# Patient Record
Sex: FEMALE | Race: BLACK OR AFRICAN AMERICAN | NOT HISPANIC OR LATINO | Employment: UNEMPLOYED | URBAN - METROPOLITAN AREA
[De-identification: names, ages, dates, MRNs, and addresses within clinical notes are randomized per-mention and may not be internally consistent; named-entity substitution may affect disease eponyms.]

---

## 2018-04-27 ENCOUNTER — OFFICE VISIT (OUTPATIENT)
Dept: PULMONOLOGY | Facility: CLINIC | Age: 38
End: 2018-04-27
Payer: MEDICAID

## 2018-04-27 ENCOUNTER — LAB VISIT (OUTPATIENT)
Dept: LAB | Facility: HOSPITAL | Age: 38
End: 2018-04-27
Attending: INTERNAL MEDICINE
Payer: MEDICAID

## 2018-04-27 VITALS
HEIGHT: 65 IN | OXYGEN SATURATION: 99 % | BODY MASS INDEX: 35.48 KG/M2 | HEART RATE: 60 BPM | SYSTOLIC BLOOD PRESSURE: 122 MMHG | WEIGHT: 212.94 LBS | RESPIRATION RATE: 18 BRPM | DIASTOLIC BLOOD PRESSURE: 82 MMHG

## 2018-04-27 DIAGNOSIS — R06.00 DYSPNEA AND RESPIRATORY ABNORMALITIES: Primary | ICD-10-CM

## 2018-04-27 DIAGNOSIS — R06.89 DYSPNEA AND RESPIRATORY ABNORMALITIES: Primary | ICD-10-CM

## 2018-04-27 DIAGNOSIS — R06.00 DYSPNEA AND RESPIRATORY ABNORMALITIES: ICD-10-CM

## 2018-04-27 DIAGNOSIS — R06.89 DYSPNEA AND RESPIRATORY ABNORMALITIES: ICD-10-CM

## 2018-04-27 LAB
CRP SERPL-MCNC: 9.5 MG/L
ERYTHROCYTE [SEDIMENTATION RATE] IN BLOOD BY WESTERGREN METHOD: 19 MM/HR

## 2018-04-27 PROCEDURE — 36415 COLL VENOUS BLD VENIPUNCTURE: CPT

## 2018-04-27 PROCEDURE — 85651 RBC SED RATE NONAUTOMATED: CPT

## 2018-04-27 PROCEDURE — 99204 OFFICE O/P NEW MOD 45 MIN: CPT | Mod: S$PBB,,, | Performed by: INTERNAL MEDICINE

## 2018-04-27 PROCEDURE — 86038 ANTINUCLEAR ANTIBODIES: CPT

## 2018-04-27 PROCEDURE — 86140 C-REACTIVE PROTEIN: CPT

## 2018-04-27 PROCEDURE — 86255 FLUORESCENT ANTIBODY SCREEN: CPT | Mod: 91

## 2018-04-27 PROCEDURE — 99203 OFFICE O/P NEW LOW 30 MIN: CPT | Mod: PBBFAC | Performed by: INTERNAL MEDICINE

## 2018-04-27 PROCEDURE — 99999 PR PBB SHADOW E&M-NEW PATIENT-LVL III: CPT | Mod: PBBFAC,,, | Performed by: INTERNAL MEDICINE

## 2018-04-27 RX ORDER — MONTELUKAST SODIUM 10 MG/1
10 TABLET ORAL DAILY
COMMUNITY
Start: 2018-02-19

## 2018-04-27 RX ORDER — FUROSEMIDE 20 MG/1
20 TABLET ORAL DAILY
COMMUNITY
Start: 2018-04-04

## 2018-04-27 RX ORDER — FLUTICASONE PROPIONATE 50 MCG
1 SPRAY, SUSPENSION (ML) NASAL
COMMUNITY
Start: 2018-03-28

## 2018-04-27 RX ORDER — METOPROLOL TARTRATE 25 MG/1
25 TABLET, FILM COATED ORAL DAILY
COMMUNITY
Start: 2018-04-03 | End: 2019-01-30

## 2018-04-27 RX ORDER — LORATADINE 10 MG/1
10 TABLET ORAL DAILY
COMMUNITY
Start: 2018-02-19

## 2018-04-27 RX ORDER — ALBUTEROL SULFATE 90 UG/1
AEROSOL, METERED RESPIRATORY (INHALATION)
COMMUNITY
Start: 2018-03-28 | End: 2019-09-11

## 2018-04-27 RX ORDER — LUBIPROSTONE 24 UG/1
24 CAPSULE, GELATIN COATED ORAL
COMMUNITY
Start: 2018-01-31

## 2018-04-27 RX ORDER — CEPHALEXIN 500 MG/1
CAPSULE ORAL
COMMUNITY
Start: 2018-04-24 | End: 2018-05-29 | Stop reason: ALTCHOICE

## 2018-04-27 NOTE — PROGRESS NOTES
"New patient    Subjective:      Patient ID: Floyd Luis is a 38 y.o. female.    Patient Active Problem List   Diagnosis    Dyspnea and respiratory abnormalities       Problem list has been reviewed.    she has been referred by Self, [facility] for evaluation and management for   Chief Complaint   Patient presents with    Asthma    Shortness of Breath    Wheezing       Chief Complaint: Asthma; Shortness of Breath; and Wheezing      HPI:      Shortness of breath for 4 months Associated with  Non productivecough, wheezing and "rattling" in her back.       Patient reports cough and difficulty breathing and denies abdominal pain and diarrhea. Patient denies recent sick contacts.   Patient does not have new pets. Patient does have a history of childhood asthma. She reports that her last asthma attack was at age 15. She has never been hospitalized for asthma, She has never been intubated for asthma.  Patient does have a history of environmental allergens. Patient has not traveled recently. Patient does not have a history of smoking. Patient has not had a previous chest x-ray. Patient has had a PPD done.  PPD was Negative      Dyspnea Characteristics:   Exertional Dyspnea   Dyspnea Duration:  Chronic  Dyspnea Severity:  TAURUS 10  Dyspnea Timing:   Nocturnal and  Orthopnea  Dyspnea Contributing Factors:  exercise   Dyspnea Associated Symptoms:  Cough, wheeze,      Modified Taurus Dyspnea Scale      0  Nothing at all    0.5  Very, very slight (just noticeable)    1  Very slight    2  Slight    3  Moderate    4 Somewhat severe    5 Severe      6    7  Very severe    8    9   Very, very severe (almost maximal)  10  Maximal    She has SABINA. She is on CPAP. She does not know her CPAP setting     Previous Report Reviewed: lab reports, office notes and radiology reports     Past Medical History: The following portions of the patient's history were reviewed and updated as appropriate:   She  has a past surgical history " "that includes Hysterectomy (2010); Hernia repair (2006); Bowel resection (Bilateral, 2002); and Gallbladder surgery (2015).  Her family history includes Hypertension in her brother and father.  She  reports that she has never smoked. She has never used smokeless tobacco. She reports that she does not drink alcohol or use drugs.  She has a current medication list which includes the following prescription(s): amitiza, cephalexin, fluticasone, furosemide, loratadine, metoprolol tartrate, montelukast, and ventolin hfa.  She is allergic to codeine; penicillins; and sulfamethoxazole-trimethoprim..    Review of Systems   Constitutional: Negative for fever, fatigue and night sweats.   HENT: Positive for sinus pressure and sore throat. Negative for congestion and ear pain.    Eyes: Negative for itching.   Respiratory: Positive for cough, wheezing and dyspnea on extertion. Negative for chest tightness.    Cardiovascular: Negative for chest pain and leg swelling.   Genitourinary: Negative for difficulty urinating.   Endocrine: Negative for cold intolerance and heat intolerance.    Musculoskeletal: Negative for arthralgias and back pain.   Skin: Negative for rash.   Gastrointestinal: Positive for nausea. Negative for vomiting and acid reflux.   Neurological: Positive for headaches. Negative for dizziness.   Hematological: Excessive bruising.   Psychiatric/Behavioral: The patient is nervous/anxious.    All other systems reviewed and are negative.         Occupational History:    Curently unemployed  Denies occupational exposure to asbestos, silica and petrochemicals.    Avocational Exposures:  none    Pet Exposures:  none      Objective:     Vitals:    04/27/18 1355   BP: 122/82   Pulse: 60   Resp: 18   SpO2: 99%   Weight: 96.6 kg (212 lb 15.4 oz)   Height: 5' 5" (1.651 m)     Body mass index is 35.44 kg/m².    Physical Exam   Constitutional: She is oriented to person, place, and time. She appears well-developed and " well-nourished.   HENT:   Head: Normocephalic and atraumatic.   Eyes: EOM are normal. Pupils are equal, round, and reactive to light.   Neck: Normal range of motion. Neck supple.   Cardiovascular: Normal rate and regular rhythm.    Pulmonary/Chest: Effort normal and breath sounds normal.   Abdominal: Soft. Bowel sounds are normal.   Musculoskeletal: Normal range of motion.   Neurological: She is alert and oriented to person, place, and time.   Skin: Skin is warm and dry. Capillary refill takes less than 2 seconds.   Psychiatric: She has a normal mood and affect. Her behavior is normal.   Nursing note and vitals reviewed.      Personal Diagnostic Review    Pulmonary function tests: PFT 01/31/17:  This is a well-done study and acceptable as per ATS criteria.  FVC  is 2.61 liters, which is 82% of predicted.  FEV1 is 1.94 liters, which is 74% of predicted.  Ratio is 74.  Post-bronchodilator spirometry did not show any change.  Total lung capacity is 3.64 liters, which is 70% of predicted and  DLCO is 81% of predicted.: Mild restriction.        Assessment /Plan:     Discussed diagnosis, its evaluation, treatment and usual course. All questions answered.    Problem List Items Addressed This Visit        Other    Dyspnea and respiratory abnormalities - Primary    Current Assessment & Plan     Etiology unclear.  Multifactorial etiology suspected.  Likely contributors to etiology (checked)    [x] Pulmonary airway disease    [x]  Pulmonary parenchymal disease  [] Pulmonary vascular disease   [] Pleural disease  [] Pulmonary vasculitis  [] Hypoventilation ( chest wall deformity, neuromuscular disease, obesity etc)  [] Anemia  [] Thyroid disease.  [] Cardiac illess  [x]         Sleep disorder    EVALUATION:  [x]        Complete PFT with bronchodilator.  []        Bronchial challenge with methacholine.   [x]        Stress test, pulmonary.  [x]        PULM - Arterial Blood Gases  [x]        Chest X Ray  []        CT scan of  chest.   [x]        CHITRA  [x]        Sedimentation rate  [x]        C-reactive protein  [x]        Anti-neutrophilic cytoplasmic antibody          PLAN:  Discussed diagnosis, its evaluation, treatment and usual course.  All questions answered.        Call if shortness of breath worsens, blood in sputum, change in character of cough, development of fever or chills, inability to maintain nutrition and hydration.     Re evaluate in four weeks with results.           Relevant Orders    Complete PFT with bronchodilator    PULM - Arterial Blood Gases--in addition to PFT only    Pulmonary stress test    Sedimentation rate, manual    C-reactive protein    CHITRA    Anti-neutrophilic cytoplasmic antibody    X-Ray Chest PA And Lateral              TIME SPENT WITH PATIENT: Time spent: 45 minutes in face to face  discussion concerning diagnosis, prognosis, review of lab and test results, benefits of treatment as well as management of disease, counseling of patient and coordination of care between various health  care providers . Greater than half the time spent was used for coordination of care and counseling of patient.     No Follow-up on file.

## 2018-04-27 NOTE — ASSESSMENT & PLAN NOTE
Etiology unclear.  Multifactorial etiology suspected.  Likely contributors to etiology (checked)    [x] Pulmonary airway disease    [x]  Pulmonary parenchymal disease  [] Pulmonary vascular disease   [] Pleural disease  [] Pulmonary vasculitis  [] Hypoventilation ( chest wall deformity, neuromuscular disease, obesity etc)  [] Anemia  [] Thyroid disease.  [] Cardiac illess  [x]         Sleep disorder    EVALUATION:  [x]        Complete PFT with bronchodilator.  []        Bronchial challenge with methacholine.   [x]        Stress test, pulmonary.  [x]        PULM - Arterial Blood Gases  [x]        Chest X Ray  []        CT scan of chest.   [x]        CHITRA  [x]        Sedimentation rate  [x]        C-reactive protein  [x]        Anti-neutrophilic cytoplasmic antibody          PLAN:  Discussed diagnosis, its evaluation, treatment and usual course.  All questions answered.        Call if shortness of breath worsens, blood in sputum, change in character of cough, development of fever or chills, inability to maintain nutrition and hydration.     Re evaluate in four weeks with results.

## 2018-04-27 NOTE — PATIENT INSTRUCTIONS
Lung Anatomy  Your lungs take air in to give your body oxygen, which the body needs to work. Your lungs, like all the tissues in your body, are made up of billions of tiny specialized cells. Old lung cells die and are replaced by new, identical lung cells. This natural process helps ensure healthy lungs.    Date Last Reviewed: 11/1/2016  © 3772-8259 [employer]. [address] [place] [address], [place] [address]. All rights reserved. This information is not intended as a substitute for professional medical care. Always follow your healthcare professional's instructions.

## 2018-04-30 LAB
ANA SER QL IF: NORMAL
ANCA AB TITR SER IF: NORMAL TITER
P-ANCA TITR SER IF: NORMAL TITER

## 2018-05-14 ENCOUNTER — TELEPHONE (OUTPATIENT)
Dept: PULMONOLOGY | Facility: CLINIC | Age: 38
End: 2018-05-14

## 2018-05-14 NOTE — TELEPHONE ENCOUNTER
----- Message from Alix Haro sent at 5/14/2018  1:43 PM CDT -----  Contact: pt mom- Caroline Velazquez state she have questions/concerns regarding patient pulmonary test . Please adv/call [phone].[email or web address]

## 2018-05-21 ENCOUNTER — PROCEDURE VISIT (OUTPATIENT)
Dept: PULMONOLOGY | Facility: CLINIC | Age: 38
End: 2018-05-21
Payer: MEDICAID

## 2018-05-21 ENCOUNTER — HOSPITAL ENCOUNTER (OUTPATIENT)
Dept: RADIOLOGY | Facility: HOSPITAL | Age: 38
Discharge: HOME | End: 2018-05-21
Attending: INTERNAL MEDICINE
Payer: MEDICAID

## 2018-05-21 VITALS — BODY MASS INDEX: 35.49 KG/M2 | WEIGHT: 213 LBS | HEIGHT: 65 IN

## 2018-05-21 DIAGNOSIS — R06.89 DYSPNEA AND RESPIRATORY ABNORMALITIES: ICD-10-CM

## 2018-05-21 DIAGNOSIS — R06.00 DYSPNEA AND RESPIRATORY ABNORMALITIES: ICD-10-CM

## 2018-05-21 LAB
ALLENS TEST: NORMAL
BF RES PRE: 34.56 BPM
BRPFT: ABNORMAL
DELSYS: NORMAL
DLCO ADJ PRE: 16.57 ML/(MIN*MMHG) (ref 20.68–32.15)
DLCO PRE: 16.57 ML/(MIN*MMHG) (ref 20.68–32.15)
DLCO SINGLE BREATH LLN: 20.68
DLCO SINGLE BREATH PRE REF: 62.7 %
DLCO SINGLE BREATH REF: 26.41
DLCOC SBVA LLN: 3.67
DLCOC SBVA PRE REF: 101.7 %
DLCOC SBVA REF: 5.18
DLCOC SINGLE BREATH LLN: 20.68
DLCOC SINGLE BREATH PRE REF: 62.7 %
DLCOC SINGLE BREATH REF: 26.41
DLCOVA LLN: 3.67
DLCOVA PRE REF: 101.7 %
DLCOVA PRE: 5.27 ML/(MIN*MMHG*L) (ref 3.67–6.69)
DLCOVA REF: 5.18
DLVAADJ PRE: 5.27 ML/(MIN*MMHG*L) (ref 3.67–6.69)
ERV LLN: 1.12
ERV PRE REF: 27.6 %
ERV PRE: 0.31 L (ref 1.12–1.12)
ERV REF: 1.12
ERVN2 LLN: 1.12
ERVN2 REF: 1.12
FEF 25 75 LLN: 1.57
FEF 25 75 PRE REF: 28.4 %
FEF 25 75 PRE: 0.82 L/S (ref 1.57–4.22)
FEF 25 75 REF: 2.89
FET100 PRE: 11.47 SEC
FEV1 FVC LLN: 72
FEV1 FVC PRE REF: 78.3 %
FEV1 FVC PRE: 64.84 % (ref 72.1–93.57)
FEV1 FVC REF: 83
FEV1 LLN: 2.09
FEV1 PRE REF: 54.9 %
FEV1 PRE: 1.48 L (ref 2.09–3.31)
FEV1 REF: 2.7
FEV6 LLN: 2.42
FEV6 PRE REF: 68 %
FEV6 PRE: 2.14 L (ref 2.42–3.87)
FEV6 REF: 3.14
FIF50 PRE: 2.12 L/S
FRCN2 LLN: 1.91
FRCN2 REF: 2.74
FRCPL PRE: 2.31 L
FRCPLETH LLN: 1.91
FRCPLETH PREREF: 84.6 %
FRCPLETH REF: 2.74
FVC LLN: 2.56
FVC PRE REF: 69.8 %
FVC PRE: 2.28 L (ref 2.56–3.99)
FVC REF: 3.27
GAW LLN: 0.33
GAW PRE REF: 85.4 %
GAW PRE: 0.28 (L/S)/CMH2O (ref 0.33–0.33)
GAW REF: 0.33
HCO3 UR-SCNC: 25 MMOL/L (ref 24–28)
ISOFEF PRE: 0.82 L/S
IVC PRE: 2 L (ref 2.56–3.99)
IVC SINGLE BREATH LLN: 2.56
IVC SINGLE BREATH PRE REF: 61.1 %
IVC SINGLE BREATH REF: 3.27
MVV LLN: 100
MVV REF: 118
PCO2 BLDA: 39.9 MMHG (ref 35–45)
PEF LLN: 4.73
PEF PRE REF: 47.4 %
PEF PRE: 3.22 L/S (ref 4.73–8.86)
PEF REF: 6.8
PH SMN: 7.4 [PH] (ref 7.35–7.45)
PO2 BLDA: 82 MMHG (ref 80–100)
POC BE: 0 MMOL/L
POC SATURATED O2: 96 % (ref 95–100)
RAW LLN: 3.06
RAW PRE REF: 117.1 %
RAW PRE: 3.58 CMH2O*S/L (ref 3.06–3.06)
RAW REF: 3.06
RV LLN: 1.03
RV PRE REF: 125.7 %
RV PRE: 2.02 L (ref 1.03–2.19)
RV REF: 1.61
RVN2 LLN: 1.03
RVN2 REF: 1.61
RVN2TLCN2 LLN: 23
RVN2TLCN2 REF: 32
RVTLC LLN: 23
RVTLC PRE REF: 141 %
RVTLC PRE: 45.44 % (ref 22.63–41.81)
RVTLC REF: 32
SAMPLE: NORMAL
SGAW LLN: 0.1
SGAW PRE REF: 118.3 %
SGAW PRE: 0.12 1/(CMH2O*S) (ref 0.1–0.1)
SGAW REF: 0.1
SITE: NORMAL
SRAW LLN: 9.81
SRAW PRE REF: 92.2 %
SRAW PRE: 9.04 CMH2O*S (ref 9.81–9.81)
SRAW REF: 9.81
TLC LLN: 4.11
TLC PRE REF: 87.3 %
TLC PRE: 4.45 L (ref 4.11–6.09)
TLC REF: 5.1
TLCN2 LLN: 4.11
TLCN2 REF: 5.1
VA PRE: 3.15 L (ref 4.12–6.32)
VA SINGLE BREATH LLN: 4.12
VA SINGLE BREATH PRE REF: 60.3 %
VA SINGLE BREATH REF: 5.22
VC LLN: 2.56
VC PRE REF: 74.3 %
VC PRE: 2.43 L (ref 2.56–3.99)
VC REF: 3.27
VCMAXN2 LLN: 2.56
VCMAXN2 REF: 3.27
VTGRAWPRE: 2.52 L

## 2018-05-21 PROCEDURE — 71046 X-RAY EXAM CHEST 2 VIEWS: CPT | Mod: TC,FY,PO

## 2018-05-21 PROCEDURE — 94010 BREATHING CAPACITY TEST: CPT | Mod: 26,59,S$PBB, | Performed by: INTERNAL MEDICINE

## 2018-05-21 PROCEDURE — 94618 PULMONARY STRESS TESTING: CPT | Mod: 26,S$PBB,, | Performed by: INTERNAL MEDICINE

## 2018-05-21 PROCEDURE — 94726 PLETHYSMOGRAPHY LUNG VOLUMES: CPT | Mod: 26,S$PBB,, | Performed by: INTERNAL MEDICINE

## 2018-05-21 PROCEDURE — 82803 BLOOD GASES ANY COMBINATION: CPT | Mod: PBBFAC,PO

## 2018-05-21 PROCEDURE — 71046 X-RAY EXAM CHEST 2 VIEWS: CPT | Mod: 26,,, | Performed by: RADIOLOGY

## 2018-05-21 PROCEDURE — 94729 DIFFUSING CAPACITY: CPT | Mod: PBBFAC,PO

## 2018-05-21 PROCEDURE — 94726 PLETHYSMOGRAPHY LUNG VOLUMES: CPT | Mod: PBBFAC,PO

## 2018-05-21 PROCEDURE — 94618 PULMONARY STRESS TESTING: CPT | Mod: PBBFAC,PO

## 2018-05-21 PROCEDURE — 94010 BREATHING CAPACITY TEST: CPT | Mod: PBBFAC,PO

## 2018-05-21 PROCEDURE — 94729 DIFFUSING CAPACITY: CPT | Mod: 26,S$PBB,, | Performed by: INTERNAL MEDICINE

## 2018-05-21 PROCEDURE — 36600 WITHDRAWAL OF ARTERIAL BLOOD: CPT | Mod: PBBFAC,PO

## 2018-05-21 PROCEDURE — 36600 WITHDRAWAL OF ARTERIAL BLOOD: CPT | Mod: 59,S$PBB,, | Performed by: INTERNAL MEDICINE

## 2018-05-21 NOTE — PROCEDURES
PHYSICIAN INTERPRETATION:    Results for orders placed or performed in visit on 05/21/18   ISTAT PROCEDURE   Result Value Ref Range    POC PH 7.404 7.35 - 7.45    POC PCO2 39.9 35 - 45 mmHg    POC PO2 82 80 - 100 mmHg    POC HCO3 25.0 24 - 28 mmol/L    POC BE 0 -2 to 2 mmol/L    POC SATURATED O2 96 95 - 100 %    Sample ARTERIAL     Site LR     Allens Test Pass     DelSys Room Air         Normal ABG.

## 2018-05-21 NOTE — PROCEDURES
"[facility]  Six Minute Walk     SUMMARY     Ordering Provider: Abdi   Interpreting Provider: Abdi  Performing nurse/tech/RT: Sushila Quintero  Diagnosis:  (Dyspnea and respiratory abnormalities)  Height: 5' 5" (165.1 cm)  Weight: 96.6 kg (213 lb)  BMI (Calculated): 35.5   Patient Race:             Phase Oxygen Assessment Supplemental O2 Heart   Rate Blood Pressure Taurus Dyspnea Scale Rating   Resting 98 % Room Air 91 bpm (!) 135/93 7-8   Exercise        Minute        1 91 % Room Air 71 bpm     2 91 % Room Air 104 bpm     3 89 % Room Air 123 bpm     4 100 % Room Air 129 bpm     5 94 % Room Air 115 bpm     6  91 % Room Air 126 bpm (!) 159/96 9   Recovery        Minute        1 98 % Room Air 113 bpm     2 99 % Room Air 106 bpm     3 100 % Room Air 102 bpm     4 100 % Room Air 99 bpm 145/89 9     Six Minute Walk Summary  6MWT Status: completed without stopping  Patient Reported: No complaints     Interpretation:  Did the patient stop or pause?: No        Total Time Walked (Calculated): 360 seconds  Final Partial Lap Distance (feet): 0 feet  Total Distance Meters (Calculated): 487.68 meters  Predicted Distance Meters (Calculated): 568.69 meters  Percentage of Predicted (Calculated): 85.75  Peak VO2 (Calculated): 18.61  Mets: 5.32  Has The Patient Had a Previous Six Minute Walk Test?: No       Previous 6MWT Results  Has The Patient Had a Previous Six Minute Walk Test?: No           PHYSICIAN INTERPRETATION:      Six minute walk distance is 487.68 / 568.69 meters  (86.75 % predicted) with very, very heavy dyspnea. Peak VO2 during walking was 18.61  Ml/min/kg [ 5.32 METS]. During exercise, there was no significant desaturation while breathing room air. Both blood pressure and heart rate increased significantly with walking. The patient did not report non-pulmonary symptoms during exercise. Severe dyspnea  is likely due to subjective symptoms. The patient did complete the study, walking " 360 seconds of the 360 second test. The patient may benefit from a graduated exercise program . No previous study performed. Based upon age and body mass index, exercise capacity is normal.

## 2018-05-29 ENCOUNTER — OFFICE VISIT (OUTPATIENT)
Dept: PULMONOLOGY | Facility: CLINIC | Age: 38
End: 2018-05-29
Payer: MEDICAID

## 2018-05-29 ENCOUNTER — LAB VISIT (OUTPATIENT)
Dept: LAB | Facility: HOSPITAL | Age: 38
End: 2018-05-29
Attending: INTERNAL MEDICINE
Payer: MEDICAID

## 2018-05-29 VITALS
OXYGEN SATURATION: 96 % | BODY MASS INDEX: 35.67 KG/M2 | DIASTOLIC BLOOD PRESSURE: 74 MMHG | RESPIRATION RATE: 18 BRPM | HEART RATE: 88 BPM | WEIGHT: 214.06 LBS | HEIGHT: 65 IN | SYSTOLIC BLOOD PRESSURE: 110 MMHG

## 2018-05-29 DIAGNOSIS — J44.89 ASTHMA WITH COPD: ICD-10-CM

## 2018-05-29 DIAGNOSIS — E66.01 CLASS 2 SEVERE OBESITY DUE TO EXCESS CALORIES WITH SERIOUS COMORBIDITY AND BODY MASS INDEX (BMI) OF 35.0 TO 35.9 IN ADULT: Chronic | ICD-10-CM

## 2018-05-29 DIAGNOSIS — G47.33 OSA ON CPAP: Chronic | ICD-10-CM

## 2018-05-29 DIAGNOSIS — J44.89 ASTHMA WITH COPD: Primary | ICD-10-CM

## 2018-05-29 PROBLEM — J44.9 COPD, SEVERE: Status: ACTIVE | Noted: 2018-05-29

## 2018-05-29 PROCEDURE — 99213 OFFICE O/P EST LOW 20 MIN: CPT | Mod: PBBFAC | Performed by: INTERNAL MEDICINE

## 2018-05-29 PROCEDURE — 99999 PR PBB SHADOW E&M-EST. PATIENT-LVL III: CPT | Mod: PBBFAC,,, | Performed by: INTERNAL MEDICINE

## 2018-05-29 PROCEDURE — 99215 OFFICE O/P EST HI 40 MIN: CPT | Mod: S$PBB,,, | Performed by: INTERNAL MEDICINE

## 2018-05-29 PROCEDURE — 36415 COLL VENOUS BLD VENIPUNCTURE: CPT

## 2018-05-29 PROCEDURE — 82542 COL CHROMOTOGRAPHY QUAL/QUAN: CPT

## 2018-05-29 PROCEDURE — 82164 ANGIOTENSIN I ENZYME TEST: CPT

## 2018-05-29 RX ORDER — ALBUTEROL SULFATE 90 UG/1
1-2 AEROSOL, METERED RESPIRATORY (INHALATION)
COMMUNITY
Start: 2015-01-14 | End: 2019-09-11 | Stop reason: SDUPTHER

## 2018-05-29 NOTE — PROGRESS NOTES
Subjective:      Established patient    Patient ID: Floyd Luis is a 38 y.o. female.  Patient Active Problem List   Diagnosis    Dyspnea and respiratory abnormalities    Asthma with COPD    SABINA on CPAP    Class 2 severe obesity due to excess calories with serious comorbidity and body mass index (BMI) of 35.0 to 35.9 in adult       Problem list has been reviewed.    Chief Complaint: Asthma and Sleep Apnea    Group Spirometric Classification Exacerbations / year mMRC CAT   Group B: Low risk; more symptoms  GOLD 1- 2  < = 1 >= 2 >=10   Group C: High risk; less symptoms  GOLD 3- 4  > = 2 0 - 1 <10   Group D: High risk; more sypmtoms  GOLD 3-4 > = 2 >= 2 >= 10     HPI    PFT, 6 MWT and ABG reviewed with patient who expressed and voiced understanding.   All questions were answered to the patients satisfaction.      Patients reports NYHA III dyspnea    The patient does not have currently have symptoms / an exacerbation.       No recent change in breathing.     Her current respiratory therapy regimen is albuterol/ipratropium inhaler which provides  relief. She is adherent with her regimen.            She is on CPAP of 8 CMWP. She definitely thinks PAP is beneficial to her health and she wants to continue with PAP therapy.    Rawson Sleepiness Scale   EPWORTH SLEEPINESS SCALE 5/29/2018   Sitting and reading 0   Watching TV 1   Sitting, inactive in a public place (e.g. a theatre or a meeting) 1   As a passenger in a car for an hour without a break 1   Lying down to rest in the afternoon when circumstances permit 2   Sitting and talking to someone 0   Sitting quietly after a lunch without alcohol 2   In a car, while stopped for a few minutes in traffic 0   Total score 7     Compliance Summary  4/24/2018 - 5/23/2018 (30 days)  Days with Device Usage 13 days  Days without Device Usage 17 days  Percent Days with Device Usage 43.3%  Cumulative Usage 18 hrs. 22 mins. 31 secs.  Maximum Usage (1 Day) 3 hrs. 11 mins.  39 secs.  Average Usage (All Days) 36 mins. 45 secs.  Average Usage (Days Used) 1 hrs. 24 mins. 48 secs.  Minimum Usage (1 Day) 14 mins.  Percent of Days with Usage >= 4 Hours 0.0%  Percent of Days with Usage < 4 Hours 100.0%  Date Range  Total Blower Time 1 day 5 hrs. 5 mins. 40 secs.  CPAP Summary  Average Time in Large Leak Per Day 1 mins. 23 secs.  Average AHI 5.3  CPAP 8.0 cmH2O       A full  review of systems, past , family  and social histories was performed except as mentioned in the note above, these are non contributory to the main issues discussed today.       Previous Report Reviewed: lab reports, office notes and radiology reports     The following portions of the patient's history were reviewed and updated as appropriate: She  has a past medical history of Asthma; Hypertension; and Thyroid disease.  She  has a past surgical history that includes Hysterectomy (2010); Hernia repair (2006); Bowel resection (Bilateral, 2002); and Gallbladder surgery (2015).  Her family history includes Hypertension in her brother and father.  She  reports that she has never smoked. She has never used smokeless tobacco. She reports that she does not drink alcohol or use drugs.  She has a current medication list which includes the following prescription(s): albuterol, amitiza, fluticasone, furosemide, loratadine, metoprolol tartrate, montelukast, ventolin hfa, and beclomethasone.  She is allergic to codeine; penicillins; and sulfamethoxazole-trimethoprim..    Review of Systems   Constitutional: Negative for fever, fatigue and night sweats.   HENT: Positive for sinus pressure and sore throat. Negative for congestion and ear pain.    Eyes: Negative for itching.   Respiratory: Positive for cough, wheezing and dyspnea on extertion. Negative for chest tightness.    Cardiovascular: Negative for chest pain and leg swelling.   Genitourinary: Negative for difficulty urinating.   Endocrine: Negative for cold intolerance and heat  "intolerance.    Musculoskeletal: Negative for arthralgias and back pain.   Skin: Negative for rash.   Gastrointestinal: Positive for nausea. Negative for vomiting and acid reflux.   Neurological: Positive for headaches. Negative for dizziness.   Hematological: Excessive bruising.   Psychiatric/Behavioral: The patient is nervous/anxious.    All other systems reviewed and are negative.       Objective:     /74   Pulse 88   Resp 18   Ht 5' 5" (1.651 m)   Wt 97.1 kg (214 lb 1.1 oz)   SpO2 96%   BMI 35.62 kg/m²   Body mass index is 35.62 kg/m².     Physical Exam   Constitutional: She is oriented to person, place, and time. She appears well-developed and well-nourished.   HENT:   Head: Normocephalic and atraumatic.   Eyes: EOM are normal. Pupils are equal, round, and reactive to light.   Neck: Normal range of motion. Neck supple.   Cardiovascular: Normal rate and regular rhythm.    Pulmonary/Chest: Effort normal and breath sounds normal.   Abdominal: Soft. Bowel sounds are normal.   Musculoskeletal: Normal range of motion.   Neurological: She is alert and oriented to person, place, and time.   Skin: Skin is warm and dry. Capillary refill takes less than 2 seconds.   Psychiatric: She has a normal mood and affect. Her behavior is normal.   Nursing note and vitals reviewed.      Personal Diagnostic Review    Cytoplasmic Neutrophilic Ab <1:20 Titer <1:20      CHITRA Screen Negative <1:160 Negative <1:160      CRP 0.0 - 8.2 mg/L 9.5       Sed Rate 0 - 20 mm/Hr 19           ABG:   Results for orders placed or performed in visit on 05/21/18   ISTAT PROCEDURE   Result Value Ref Range     POC PH 7.404 7.35 - 7.45     POC PCO2 39.9 35 - 45 mmHg     POC PO2 82 80 - 100 mmHg     POC HCO3 25.0 24 - 28 mmol/L     POC BE 0 -2 to 2 mmol/L     POC SATURATED O2 96 95 - 100 %     Sample ARTERIAL       Site LR       Allens Test Pass       DelSys Room Air           Normal ABG.      Pulmonary function tests: FEV1: 1.48 (54.9 % " predicted), FVC:  2.28 (69.8 % predicted), FEV1/FVC:  65, T.45 (87.3 % predicted), RV/TLVC: 45 (141 % predicted), DLCO: 16 (57 % predicted)  : Test is of acceptable quality. Baseline spirometry reveals moderately severe expiratory airflow obstruction. Post bronchodilatory spirometry was not performed because patient  used her prescribed bronchodilator on the morning of the test. Static lung volumes reveal mild air trapping but no hyperinflation. Unadjusted diffusion capacity is mildly reduced, however diffusion capacity adjusted for alveolar volume is normal. This is because alveolar volume is underestimated indicating that there are large areas of underventliated lung.      Six Minute Walk Test:Six minute walk distance is 487.68 / 568.69 meters  (86.75 % predicted) with very, very heavy dyspnea. Peak VO2 during walking was 18.61  Ml/min/kg [ 5.32 METS]. During exercise, there was no significant desaturation while breathing room air. Both blood pressure and heart rate increased significantly with walking. The patient did not report non-pulmonary symptoms during exercise. Severe dyspnea  is likely due to subjective symptoms. The patient did complete the study, walking 360 seconds of the 360 second test. The patient may benefit from a graduated exercise program . No previous study performed. Based upon age and body mass index, exercise capacity is normal.      Chest x-ray: The cardiac and mediastinal silhouettes appear within normal limits.   Linear opacity representing discoid atelectasis versus scarring noted in the right lung base.  No parenchymal consolidations or pleural effusions demonstrated.  No acute osseous findings demonstrated.          MARCIA Index for COPD Survival Prediction   Select Criteria:   FEV1 % Predicted After Bronchodialator     [] >= 65% (0 points)    [x] 50-64% (1 point)    [] 36-49% (2 points)    [] <= 35% (3 points)   6 Minute Walk Distance     [x] >= 350 Meters (0 points)    [] 250-349  Meters (1 point)    [] 150-249 Meters (2 points)    [] <= 149 Meters (3 points)   MMRC Dyspnea Scale (4 is worst)     [x] MMRC 0: Dyspneic on strenuous excercise (0 points)    [] MMRC 1: Dyspneic on walking a slight hill (0 points)    [] MMRC 2: Dyspneic on walking level ground; must stop occasionally due to breathlessness (1 point)    [] MMRC 3: Must stop for breathlessness after walking 100 yards or after a few minutes (2 points)    [] MMRC 4: Cannot leave house; breathless on dressing/undressing (3 points)   Body Mass Index     [x] > 21 (0 points)    [] <= 21 (1 point)   Results: Total Criteria Point Count:     Approximate 4 Year Survival Interpretation   0-2 Points:  [x] 80%   3-4 Points:  [] 67%   5-6 Points:  [] 57%   7-10 Points:[] 18%         References  1. Vivian BR, Stacey CG, Tim HASSAN, et. al. The body-mass index, airflow obstruction, dyspnea and exercise capacity index in chronic obstructive pulmonary disease. N Engl J Med. 2004 Mar 4;350(10):1005-12.  2. Sunil DA, Regulo CK. Evaluation of clinical methods for rating dyspnea. Chest. 1988 Mar;93(3):580-6      Assessment / Plan:       Discussed diagnosis, its evaluation, treatment and usual course. All questions answered.    Problem List Items Addressed This Visit        Pulmonary    Asthma with COPD - Primary    Current Assessment & Plan     COPD ROS:  PERSISTENT BURGER.  Using bronchodilator MDI less than twice a week.   New concerns:PERSISTENT BURGER.   Exam: appears well, vitals normal, no respiratory distress, acyanotic, normal RR.   Assessment:  COPD control uncertain.   Plan: Start ANORO. Continue COMBIVENT RESPIMAT. Current treatment plan is effective, no change in therapy.         Relevant Medications    beclomethasone (QVAR) 80 mcg/actuation Aero    Other Relevant Orders    CT Chest With Contrast    ALPHA 1 ANTITRYPSIN DEFIICIENCY PROFILE    Angiotensin converting enzyme       Other    SABINA on CPAP    Current Assessment & Plan     Continue CPAP of 8. (DME  - [facility])     Discussed therapeutic goals for positive airway pressure therapy(CPAP or BiPAP): Ideal is usage 100% of nights for 6 - 8 hours per night. Minimum usage is 70% of night for at least 4 hours per night used. Pateint expressed understanding. All Questions answered.    CPAP supplies renewed.         Class 2 severe obesity due to excess calories with serious comorbidity and body mass index (BMI) of 35.0 to 35.9 in adult    Current Assessment & Plan     Complications associated with obesity reviewed . These include but are not limited to HTN, CAD,CHF, Respiratory disease, Insulin resistance syndrome, hyperlipidemia, atrial fibrillation, cancer  (endometrial, breast, kidney, colorectal, esophageal, renal, pancreatic and gall bladder cancer),GERD and NAFLD. Weight loss approaches reviewd with patient. Combination modality that includes eating behaviour changes, moderate excercise, adjuncvtive pharmacological therapy reviewed.  Patient expressed and verbalized understanding.                 TIME SPENT WITH PATIENT: Time spent: 45 minutes in face to face  discussion concerning diagnosis, prognosis, review of lab and test results, benefits of treatment as well as management of disease, counseling of patient and coordination of care between various health  care providers . Greater than half the time spent was used for coordination of care and counseling of patient.       Follow-up in about 4 weeks (around 6/26/2018) for Asthma with COPD, SABINA, Obesity.

## 2018-05-29 NOTE — ASSESSMENT & PLAN NOTE
COPD ROS:  PERSISTENT BURGER.  Using bronchodilator MDI less than twice a week.   New concerns:PERSISTENT BURGER.   Exam: appears well, vitals normal, no respiratory distress, acyanotic, normal RR.   Assessment:  COPD control uncertain.   Plan: Start ANORO. Continue COMBIVENT RESPIMAT. Current treatment plan is effective, no change in therapy.

## 2018-05-29 NOTE — PATIENT INSTRUCTIONS
Lung Anatomy  Your lungs take air in to give your body oxygen, which the body needs to work. Your lungs, like all the tissues in your body, are made up of billions of tiny specialized cells. Old lung cells die and are replaced by new, identical lung cells. This natural process helps ensure healthy lungs.    Date Last Reviewed: 11/1/2016  © 2235-4304 [employer]. [address] [place] [address], [place] [address]. All rights reserved. This information is not intended as a substitute for professional medical care. Always follow your healthcare professional's instructions.

## 2018-05-29 NOTE — ASSESSMENT & PLAN NOTE
Continue CPAP of 8. ([facility] [place] [facility])     Discussed therapeutic goals for positive airway pressure therapy(CPAP or BiPAP): Ideal is usage 100% of nights for 6 - 8 hours per night. Minimum usage is 70% of night for at least 4 hours per night used. Pateint expressed understanding. All Questions answered.    CPAP supplies renewed.

## 2018-05-31 LAB — ACE SERPL-CCNC: 31 U/L

## 2018-06-05 ENCOUNTER — HOSPITAL ENCOUNTER (OUTPATIENT)
Dept: RADIOLOGY | Facility: HOSPITAL | Age: 38
Discharge: HOME | End: 2018-06-05
Attending: INTERNAL MEDICINE
Payer: MEDICAID

## 2018-06-05 DIAGNOSIS — J44.89 ASTHMA WITH COPD: ICD-10-CM

## 2018-06-05 LAB
A1AT PROTEOTYPE S/Z, LC-MS/MS: NORMAL
A1AT SERPL NEPH-MCNC: 158 MG/DL

## 2018-06-05 PROCEDURE — 71260 CT THORAX DX C+: CPT | Mod: 26,,, | Performed by: RADIOLOGY

## 2018-06-05 PROCEDURE — 25500020 PHARM REV CODE 255: Mod: PO | Performed by: INTERNAL MEDICINE

## 2018-06-05 PROCEDURE — 71260 CT THORAX DX C+: CPT | Mod: TC,PO

## 2018-06-05 RX ADMIN — IOHEXOL 75 ML: 350 INJECTION, SOLUTION INTRAVENOUS at 10:06

## 2018-08-16 ENCOUNTER — OFFICE VISIT (OUTPATIENT)
Dept: PULMONOLOGY | Facility: CLINIC | Age: 38
End: 2018-08-16
Payer: MEDICAID

## 2018-08-16 VITALS
HEIGHT: 65 IN | RESPIRATION RATE: 18 BRPM | OXYGEN SATURATION: 99 % | DIASTOLIC BLOOD PRESSURE: 78 MMHG | BODY MASS INDEX: 35.74 KG/M2 | HEART RATE: 77 BPM | SYSTOLIC BLOOD PRESSURE: 136 MMHG | WEIGHT: 214.5 LBS

## 2018-08-16 DIAGNOSIS — E66.01 CLASS 2 SEVERE OBESITY DUE TO EXCESS CALORIES WITH SERIOUS COMORBIDITY AND BODY MASS INDEX (BMI) OF 35.0 TO 35.9 IN ADULT: Chronic | ICD-10-CM

## 2018-08-16 DIAGNOSIS — J44.89 ASTHMA WITH COPD: Primary | Chronic | ICD-10-CM

## 2018-08-16 DIAGNOSIS — G47.33 OSA ON CPAP: Chronic | ICD-10-CM

## 2018-08-16 PROCEDURE — 99999 PR PBB SHADOW E&M-EST. PATIENT-LVL III: CPT | Mod: PBBFAC,,, | Performed by: INTERNAL MEDICINE

## 2018-08-16 PROCEDURE — 99215 OFFICE O/P EST HI 40 MIN: CPT | Mod: S$PBB,,, | Performed by: INTERNAL MEDICINE

## 2018-08-16 PROCEDURE — 99213 OFFICE O/P EST LOW 20 MIN: CPT | Mod: PBBFAC | Performed by: INTERNAL MEDICINE

## 2018-08-16 NOTE — ASSESSMENT & PLAN NOTE
Continue CPAP of 8. ([facility] [place] [facility]     Discussed therapeutic goals for positive airway pressure therapy(CPAP or BiPAP): Ideal is usage 100% of nights for 6 - 8 hours per night. Minimum usage is 70% of night for at least 4 hours per night used. Pateint expressed understanding. All Questions answered.    CPAP supplies renewed.

## 2018-08-16 NOTE — PATIENT INSTRUCTIONS
Lung Anatomy  Your lungs take air in to give your body oxygen, which the body needs to work. Your lungs, like all the tissues in your body, are made up of billions of tiny specialized cells. Old lung cells die and are replaced by new, identical lung cells. This natural process helps ensure healthy lungs.    Date Last Reviewed: 11/1/2016  © 1220-9692 [employer]. [address] [place] [address], [place] [address]. All rights reserved. This information is not intended as a substitute for professional medical care. Always follow your healthcare professional's instructions.

## 2018-08-16 NOTE — ASSESSMENT & PLAN NOTE
COPD ROS:  PERSISTENT BURGER.  Using bronchodilator MDI less than twice a week.   New concerns:PERSISTENT BURGER.   Exam: appears well, vitals normal, no respiratory distress, acyanotic, normal RR.   Assessment:  COPD control uncertain.   Plan: QVAR, VENTOLIN, SINGULAIR. Current treatment plan is effective, no change in therapy. AMBULATORY REFERRAL TO PULMONARY DISEASE MANAGEMENT.

## 2018-08-20 ENCOUNTER — TELEPHONE (OUTPATIENT)
Dept: PULMONOLOGY | Facility: CLINIC | Age: 38
End: 2018-08-20

## 2018-08-20 NOTE — TELEPHONE ENCOUNTER
Called to schedule Pulmonary Disease Management initial appointment. Patients mother ask to call back at later date once she knows her schedule.

## 2018-08-21 ENCOUNTER — TELEPHONE (OUTPATIENT)
Dept: PULMONOLOGY | Facility: CLINIC | Age: 38
End: 2018-08-21

## 2018-09-10 ENCOUNTER — TELEPHONE (OUTPATIENT)
Dept: PULMONOLOGY | Facility: CLINIC | Age: 38
End: 2018-09-10

## 2018-09-10 NOTE — TELEPHONE ENCOUNTER
----- Message from Krys Ortiz LPN sent at 9/10/2018 11:08 AM CDT -----  Contact: pt      ----- Message -----  From: Karen Wilkins  Sent: 9/10/2018  10:41 AM  To: [facility] Staff    Pt needs to reschedule the pulmonary dis management appt she has tomorrow.   Cell #[phone]

## 2018-09-10 NOTE — TELEPHONE ENCOUNTER
----- Message from Dara Morales sent at 9/10/2018 11:34 AM CDT -----  Contact: pt  She's calling stating that she needs a letter to give to the [employer] stating that she has COPD and that she isn't able to work, please advise [phone]

## 2018-09-17 ENCOUNTER — PROCEDURE VISIT (OUTPATIENT)
Dept: PULMONOLOGY | Facility: CLINIC | Age: 38
End: 2018-09-17
Payer: MEDICAID

## 2018-09-17 ENCOUNTER — TELEPHONE (OUTPATIENT)
Dept: PULMONOLOGY | Facility: CLINIC | Age: 38
End: 2018-09-17

## 2018-09-17 VITALS
WEIGHT: 211 LBS | HEART RATE: 82 BPM | RESPIRATION RATE: 16 BRPM | OXYGEN SATURATION: 98 % | BODY MASS INDEX: 35.16 KG/M2 | HEIGHT: 65 IN

## 2018-09-17 DIAGNOSIS — J44.89 ASTHMA WITH COPD: Primary | ICD-10-CM

## 2018-09-17 PROCEDURE — 94664 DEMO&/EVAL PT USE INHALER: CPT | Mod: PBBFAC

## 2018-09-17 NOTE — TELEPHONE ENCOUNTER
Returned patients call to reschedule PDM appointment at her request.  Rescheduled for 9/17/18.  Patient stated understanding.

## 2018-09-17 NOTE — PROGRESS NOTES
"Pulmonary Disease Management  [facility]  Initial Visit    Referring Provider: Dr Patton  Diagnosis: Chronic Care Management: Asthma with COPD, SABINA  Last Hospital Admission: NA  Last provider visit: 8/16/18      Current Outpatient Medications:     albuterol 90 mcg/actuation inhaler, Inhale 1-2 puffs into the lungs., Disp: , Rfl:     beclomethasone (QVAR) 80 mcg/actuation Aero, Inhale 1 puff into the lungs 2 (two) times daily. Controller, Disp: 120 each, Rfl: 5    fluticasone (FLONASE) 50 mcg/actuation nasal spray, 1 spray. , Disp: , Rfl:     loratadine (CLARITIN) 10 mg tablet, Take 10 mg by mouth once daily. , Disp: , Rfl:     montelukast (SINGULAIR) 10 mg tablet, Take 10 mg by mouth once daily. , Disp: , Rfl:     VENTOLIN HFA 90 mcg/actuation inhaler, , Disp: , Rfl:     Review of patient's allergies indicates:   Allergen Reactions    Codeine Nausea And Vomiting    Penicillins Nausea And Vomiting    Sulfamethoxazole-trimethoprim Nausea And Vomiting       Smoking history:   Social History     Tobacco Use   Smoking Status Never Smoker   Smokeless Tobacco Never Used       Pulse: 82  SpO2: 98 %  Pulse Oximetry Type: Intermittent  Resp: 16  Height: 5' 5" (165.1 cm)  Weight: 95.7 kg (210 lb 15.7 oz)  BMI (kg/m2): 35.18    All Lung Fields Breath Sounds: clear  Cough Frequency: infrequent  Cough Type: nonproductive  Sputum Amount: scant  Sputum Color: clear       Resting Taurus Dyspnea Rating : 1      Current Oxygen Use: No      Does the patient use BiPAP, CPAP or Trilogy?: No(patient not compliant with SABINA, needs to redue Sleep Study)        ACT Questionnaire:  Asthma Control Test  In the past 4  weeks, how much of the time did your asthma keep you from getting as much done at work, school or at home?: None of the time  During the past 4 weeks, how often have you had shortness of breath?: Once or twice a week  During the past 4 weeks, how often did your asthma symptoms (wheezing, couging, shortness of " "breath, chest tightness or pain) wake you up at night or earlier than usual in the morning?: Once a week  During the past 4 weeks, how often have you used your rescue inhaler or nebulizer medication (such as albuterol)?: Once a week or less  How would you rate your asthma control during the past 4 weeks?: Somewhat controlled  If your score is 19 or less, your asthma may not be under control: 19         COPD Questionnaire:  COPD QUESTIONNAIRE  How often do you cough?: Almost never  How often do you have phlegm (mucus) in your chest?: A little of the time  How often does your chest feel tight?: Almost never  When you walk up a hill or one flight of stairs, how often are you breathless?: Some of the time  How often are you limited doing any activities at home?: A little of the time  How often are you confident leaving the house despite your lung condition?: All of the time  How often do you sleep soundly?: A little of the time  How often do you have energy?: Almost never  Total score: 16    Has this patient had any pulmonary studies (PFTS)?: Yes  PFT Results:  No results found for: PREFVC- 2.28 (69.8%), PREFEV1-1.48 (54.9%), , AXQUIJ7WNO--67 (78.3%), PRETLC- 4.45 (87.3%), PREDLCO- 16.57 (62.7%)      Is this patient a candidate for pulmonary rehab?: No     Method Used for Education: Literature, Demonstration, Verbal  Did the patient demonstrate understanding?: Yes       Patient Concerns or Expectations: Patient feels tired/fatigue most of the time    Therapist Comments:   Reviewed medications purpose and proper technique of HFA.   Patient was given and practiced proper technique using the Respironics valved holding chamber. Demonstrated understanding using teach back method. Patient's technique improved with practice.     Patient is using QVAR once a day.  Discussed "What is Asthma" and the purpose of using QVAR twice daily and rinsing mouth following use.  Patient stated understanding.    Reviewed and patient " "understands the difference between her rescue and controller medications.     Reviewed self monitoring (worsening of) signs and symptoms for Asthma.    Reviewed and literature given on "Asthma triggers".  Patient stated understanding.     Patient is not compliant with CPAP use;  is in process of rescheduling sleep study for re-qualification.     Plan:    Reinforce education  Meds: SAJAN, ICS  DME Needs: none  Action Plan: Asthma  Immunization: Pneumococcal- none, Flu-due this fall  Next Provider Visit: none (will let staff know)  Next Spirometry/CPFT: none (will let staff know)    Approximate time spent with patient: 50 minutes  "

## 2018-10-09 DIAGNOSIS — J44.89 ASTHMA WITH COPD: ICD-10-CM

## 2018-10-18 ENCOUNTER — PATIENT OUTREACH (OUTPATIENT)
Dept: PULMONOLOGY | Facility: CLINIC | Age: 38
End: 2018-10-18

## 2018-10-18 NOTE — TELEPHONE ENCOUNTER
Chronic Disease Management  Called patient to complete Pulmonary Disease Management Questionnaire.

## 2018-11-08 ENCOUNTER — TELEPHONE (OUTPATIENT)
Dept: PULMONOLOGY | Facility: CLINIC | Age: 38
End: 2018-11-08

## 2018-11-08 NOTE — TELEPHONE ENCOUNTER
----- Message from Steph Torres sent at 11/8/2018  3:47 PM CST -----  states that she's still having breathing issues, would like to be seen...[phone]

## 2018-11-12 ENCOUNTER — TELEPHONE (OUTPATIENT)
Dept: PULMONOLOGY | Facility: CLINIC | Age: 38
End: 2018-11-12

## 2018-11-12 DIAGNOSIS — J20.9 COPD (CHRONIC OBSTRUCTIVE PULMONARY DISEASE) WITH ACUTE BRONCHITIS: Primary | ICD-10-CM

## 2018-11-12 DIAGNOSIS — J44.0 COPD (CHRONIC OBSTRUCTIVE PULMONARY DISEASE) WITH ACUTE BRONCHITIS: Primary | ICD-10-CM

## 2018-11-12 NOTE — TELEPHONE ENCOUNTER
SITUATION:    Patient complains of cough. Symptoms began 4 day(s) ago. Cough described as productive of green/yellow sputum.   Associated symptoms include rhinorrhea, cough, wheezing     Treatment to date: no treatment to date      BACKGROUND:     RESPIRATORY MEDICATIONS:    Ventolin hfa  qvar    claritin and singulair      PMHSHx:   has a past medical history of Asthma, Hypertension, and Thyroid disease.  family history includes Hypertension in her brother and father.   reports that  has never smoked. she has never used smokeless tobacco. She reports that she does not drink alcohol or use drugs.    ASSSESSMENT:    Acute Bronchitis     RECOMMENDATIONS:     1. Oral Azithromycin per orders  2. Oral Prednisone per orders  3. Oral benzonatate per orders    PRESCRIPTIONS SENT TO [facility]

## 2018-11-12 NOTE — TELEPHONE ENCOUNTER
----- Message from Karen Wilkins sent at 11/12/2018  9:55 AM CST -----  Contact: pt  Pt would like to speak with nurse to reschedule tomorrow's appt on 11/13.  The appt desk is unable to reschedule for the following week. [phone]

## 2018-11-13 RX ORDER — PREDNISONE 20 MG/1
40 TABLET ORAL DAILY
Qty: 10 TABLET | Refills: 0 | Status: SHIPPED | OUTPATIENT
Start: 2018-11-13 | End: 2018-11-18

## 2018-11-13 RX ORDER — AZITHROMYCIN 500 MG/1
500 TABLET, FILM COATED ORAL DAILY
Qty: 5 TABLET | Refills: 0 | Status: SHIPPED | OUTPATIENT
Start: 2018-11-13 | End: 2018-11-18

## 2018-11-13 RX ORDER — BENZONATATE 100 MG/1
100 CAPSULE ORAL EVERY 4 HOURS PRN
Qty: 120 CAPSULE | Refills: 3 | Status: SHIPPED | OUTPATIENT
Start: 2018-11-13 | End: 2018-12-13

## 2018-11-20 ENCOUNTER — PATIENT OUTREACH (OUTPATIENT)
Dept: PULMONOLOGY | Facility: CLINIC | Age: 38
End: 2018-11-20

## 2018-11-20 NOTE — TELEPHONE ENCOUNTER
Chronic Disease Management  Called patient to complete Pulmonary Disease Management Questionnaire.    Asthma with COPD  SABINA on CPAP      Patient states she is doing better and adheres to regimen for taking respiratory medications.     Time  spent with patient:  10 Minutes

## 2019-01-03 ENCOUNTER — PATIENT OUTREACH (OUTPATIENT)
Dept: PULMONOLOGY | Facility: CLINIC | Age: 39
End: 2019-01-03

## 2019-01-03 NOTE — TELEPHONE ENCOUNTER
Chronic Disease Management  Called patient to complete Pulmonary Disease Management Questionnaire.  SABINA on CPAP  Asthma with COPD    Patient stated she received an older CPAP machine and the mask doesn't fit correctly. Patient will call me with the DME company information for simeon    Time  spent with patient:  Minutes

## 2019-01-18 ENCOUNTER — TELEPHONE (OUTPATIENT)
Dept: PULMONOLOGY | Facility: CLINIC | Age: 39
End: 2019-01-18

## 2019-01-18 NOTE — TELEPHONE ENCOUNTER
Spoke with patient about CPAP machine and instructed patient to bring it with her when she comes to see the provider next month.

## 2019-01-28 ENCOUNTER — TELEPHONE (OUTPATIENT)
Dept: PULMONOLOGY | Facility: CLINIC | Age: 39
End: 2019-01-28

## 2019-01-28 NOTE — TELEPHONE ENCOUNTER
Patient informed that medicaid guidelines require a new CPAP titration because of noncompliance for cpap supplies. Appointment scheduled for 1/30/19

## 2019-01-28 NOTE — TELEPHONE ENCOUNTER
----- Message from An Ghotra sent at 1/28/2019 10:56 AM CST -----  Contact: pt  She's calling in regards to sleep apnea ,pls call pt back at [phone] (home) or  [phone]

## 2019-01-30 ENCOUNTER — OFFICE VISIT (OUTPATIENT)
Dept: PULMONOLOGY | Facility: CLINIC | Age: 39
End: 2019-01-30
Payer: MEDICAID

## 2019-01-30 VITALS
DIASTOLIC BLOOD PRESSURE: 90 MMHG | WEIGHT: 227.38 LBS | SYSTOLIC BLOOD PRESSURE: 152 MMHG | HEIGHT: 65 IN | HEART RATE: 97 BPM | OXYGEN SATURATION: 96 % | RESPIRATION RATE: 18 BRPM | BODY MASS INDEX: 37.88 KG/M2

## 2019-01-30 DIAGNOSIS — E66.01 CLASS 2 SEVERE OBESITY DUE TO EXCESS CALORIES WITH SERIOUS COMORBIDITY AND BODY MASS INDEX (BMI) OF 35.0 TO 35.9 IN ADULT: Chronic | ICD-10-CM

## 2019-01-30 DIAGNOSIS — J44.89 ASTHMA WITH COPD: Chronic | ICD-10-CM

## 2019-01-30 DIAGNOSIS — G47.33 OSA ON CPAP: Primary | Chronic | ICD-10-CM

## 2019-01-30 PROCEDURE — 99213 OFFICE O/P EST LOW 20 MIN: CPT | Mod: PBBFAC | Performed by: INTERNAL MEDICINE

## 2019-01-30 PROCEDURE — 99999 PR PBB SHADOW E&M-EST. PATIENT-LVL III: CPT | Mod: PBBFAC,,, | Performed by: INTERNAL MEDICINE

## 2019-01-30 PROCEDURE — 99215 PR OFFICE/OUTPT VISIT, EST, LEVL V, 40-54 MIN: ICD-10-PCS | Mod: S$PBB,,, | Performed by: INTERNAL MEDICINE

## 2019-01-30 PROCEDURE — 99215 OFFICE O/P EST HI 40 MIN: CPT | Mod: S$PBB,,, | Performed by: INTERNAL MEDICINE

## 2019-01-30 PROCEDURE — 99999 PR PBB SHADOW E&M-EST. PATIENT-LVL III: ICD-10-PCS | Mod: PBBFAC,,, | Performed by: INTERNAL MEDICINE

## 2019-01-30 RX ORDER — LOSARTAN POTASSIUM AND HYDROCHLOROTHIAZIDE 12.5; 5 MG/1; MG/1
TABLET ORAL
COMMUNITY

## 2019-01-30 NOTE — ASSESSMENT & PLAN NOTE
Asthma with COPD ROS:  PERSISTENT BURGER.  Using bronchodilator MDI less than twice a week.   New concerns:PERSISTENT BURGER.   Exam: appears well, vitals normal, no respiratory distress, acyanotic, normal RR.   Assessment: Asthma with COPD .Stable.   Plan:  QVAR, VENTOLIN,SINGULAIR.  Current treatment plan is effective, no change in therapy.

## 2019-01-30 NOTE — ASSESSMENT & PLAN NOTE
She was intolerant of CPAP. However she wants to resume CPAP. CPAP retitiration is required by her insurance. Sheridan to dface visit required by insurance prior to CPAP re titration.    CPAP re titration ordered.

## 2019-01-30 NOTE — PATIENT INSTRUCTIONS
Continuous Positive Air Pressure (CPAP)     A mask over the nose gently directs air into the throat to keep the airway open.   Continuous positive air pressure (CPAP) uses gentle air pressure to hold the airway open. CPAP is often the most effective treatment for sleep apnea and severe snoring. It works very well for many people. But keep in mind that it can take several adjustments before the setup is right for you.  How CPAP works  The CPAP machine  is a small portable pump beside the bed. The pump sends air through a hose, which is held over your nose and mouth by a mask. Mild air pressure is gently pushed through your airway. The air pressure nudges sagging tissues aside. This widens the airway so you can breathe better. CPAP may be combined with other kinds of therapy for sleep apnea.  Types of air pressure treatments  There are different types of CPAP. Your doctor or CPAP technician will help you decide which type is best for you:  · Basic CPAP keeps the pressure constant all night long.  · A bilevel device (BiPAP) provides more pressure when you breathe in and less when you breathe out. A BiPAP machine also may be set to provide automatic breaths to maintain breathing if you stop breathing while sleeping.  · An autoCPAP device automatically adjusts pressure throughout the night and in response to changes such as body position, sleep stage, and snoring.  Date Last Reviewed: 8/10/2015  © 1911-4672 [employer]. [address] [place] [address], [place] [address]. All rights reserved. This information is not intended as a substitute for professional medical care. Always follow your healthcare professional's instructions.

## 2019-01-30 NOTE — PROGRESS NOTES
Subjective:      Established patient    Patient ID: Floyd Luis is a 39 y.o. female.  Patient Active Problem List   Diagnosis    Asthma with COPD    SABINA on CPAP    Class 2 severe obesity due to excess calories with serious comorbidity and body mass index (BMI) of 35.0 to 35.9 in adult       Problem list has been reviewed.    Chief Complaint: Asthma with COPD and Sleep Apnea    Group Spirometric Classification Exacerbations / year mMRC CAT   Group B: Low risk; more symptoms  GOLD 1- 2  < = 1 >= 2 >=10     FEV1: 1.48 (54.9 % predicted       HPI      She was intolerant of CPAP. [place] [facility] However she wants to resume CPAP. She was issued a used CPAP. She is currently  complaint with CPAP.       Compliance Summary  9/8/2018 - 10/7/2018 (30 days)  Days with Device Usage 25 days  Days without Device Usage 5 days  Percent Days with Device Usage 83.3%  Cumulative Usage 8 days 5 hrs. 34 mins. 57 secs.  Maximum Usage (1 Day) 11 hrs. 16 mins. 39 secs.  Average Usage (All Days) 6 hrs. 35 mins. 9 secs.  Average Usage (Days Used) 7 hrs. 54 mins. 11 secs.  Minimum Usage (1 Day) 1 hrs. 22 mins. 46 secs.  Percent of Days with Usage >= 4 Hours 73.3%  Percent of Days with Usage < 4 Hours 26.7%  Date Range  Total Blower Time 8 days 5 hrs. 34 mins. 57 secs.        CPAP retitiration is required by her insurance  (MEDICAID) for supplies.   Face to face visit is ALSO required by insurance prior to CPAP re titration.    [place] Sleepiness Scale   EPWORTH SLEEPINESS SCALE 1/30/2019 8/16/2018 5/29/2018   Sitting and reading 0 3 0   Watching TV 0 2 1   Sitting, inactive in a public place (e.g. a theatre or a meeting) 0 3 1   As a passenger in a car for an hour without a break 0 0 1   Lying down to rest in the afternoon when circumstances permit 3 3 2   Sitting and talking to someone 0 0 0   Sitting quietly after a lunch without alcohol 1 2 2   In a car, while stopped for a few minutes in traffic 0 0 0   Total score  4 13 7           Patients reports Stable NYHA II  Dyspnea with exertion.    The patient does not have currently have symptoms / an exacerbation.       No recent change in breathing.     Her current respiratory therapy regimen is QVAR, VENTOLIN,SINGULAIR  which provides  relief. She is adherent with her regimen.      Asthma Control Test  In the past 4  weeks, how much of the time did your asthma keep you from getting as much done at work, school or at home?: A little of the time  During the past 4 weeks, how often have you had shortness of breath?: More than once a day  During the past 4 weeks, how often did your asthma symptoms (wheezing, couging, shortness of breath, chest tightness or pain) wake you up at night or earlier than usual in the morning?: 2 or 3 nights a week  During the past 4 weeks, how often have you used your rescue inhaler or nebulizer medication (such as albuterol)?: 2 or 3 times a week  How would you rate your asthma control during the past 4 weeks?: Somewhat controlled  If your score is 19 or less, your asthma may not be under control: 13     COPD QUESTIONNAIRE  How often do you cough?: Most of the time  How often do you have phlegm (mucus) in your chest?: Never  How often does your chest feel tight?: Never  When you walk up a hill or one flight of stairs, how often are you breathless?: All of the time  How often are you limited doing any activities at home?: Never  How often are you confident leaving the house despite your lung condition?: All of the time  How often do you sleep soundly?: Almost never  How often do you have energy?: Almost never  Total score: 17      A full  review of systems, past , family  and social histories was performed except as mentioned in the note above, these are non contributory to the main issues discussed today.       Previous Report Reviewed: lab reports, office notes and radiology reports     The following portions of the patient's history were reviewed and  "updated as appropriate: She  has a past medical history of Asthma, Hypertension, and Thyroid disease.  She  has a past surgical history that includes Hysterectomy (2010); Hernia repair (2006); Bowel resection (Bilateral, 2002); and Gallbladder surgery (2015).  Her family history includes Hypertension in her brother and father.  She  reports that  has never smoked. she has never used smokeless tobacco. She reports that she does not drink alcohol or use drugs.  She has a current medication list which includes the following prescription(s): albuterol, amitiza, beclomethasone, fluticasone, furosemide, loratadine, losartan-hydrochlorothiazide 50-12.5 mg, montelukast, and ventolin hfa.  She is allergic to codeine; penicillins; and sulfamethoxazole-trimethoprim..    Review of Systems   Constitutional: Negative for fever, fatigue and night sweats.   HENT: Positive for sinus pressure and sore throat. Negative for congestion and ear pain.    Eyes: Negative for itching.   Respiratory: Positive for cough, wheezing and dyspnea on extertion. Negative for chest tightness.    Cardiovascular: Negative for chest pain and leg swelling.   Genitourinary: Negative for difficulty urinating.   Endocrine: Negative for cold intolerance and heat intolerance.    Musculoskeletal: Negative for arthralgias and back pain.   Skin: Negative for rash.   Gastrointestinal: Positive for nausea. Negative for vomiting and acid reflux.   Neurological: Positive for headaches. Negative for dizziness.   Hematological: Excessive bruising.   Psychiatric/Behavioral: The patient is nervous/anxious.    All other systems reviewed and are negative.       Objective:     BP (!) 152/90   Pulse 97   Resp 18   Ht 5' 5" (1.651 m)   Wt 103.1 kg (227 lb 6.5 oz)   SpO2 96%   BMI 37.84 kg/m²   Body mass index is 37.84 kg/m².     Physical Exam   Constitutional: She is oriented to person, place, and time. She appears well-developed and well-nourished.   HENT:   Head: " Normocephalic and atraumatic.   Eyes: EOM are normal. Pupils are equal, round, and reactive to light.   Neck: Normal range of motion. Neck supple.   Cardiovascular: Normal rate and regular rhythm.   Pulmonary/Chest: Effort normal and breath sounds normal.   Abdominal: Soft. Bowel sounds are normal.   Musculoskeletal: Normal range of motion.   Neurological: She is alert and oriented to person, place, and time.   Skin: Skin is warm and dry. Capillary refill takes less than 2 seconds.   Psychiatric: She has a normal mood and affect. Her behavior is normal.   Nursing note and vitals reviewed.      Personal Diagnostic Review  None pertinent.    Assessment / Plan:       Discussed diagnosis, its evaluation, treatment and usual course. All questions answered.    Problem List Items Addressed This Visit        Pulmonary    Asthma with COPD    Current Assessment & Plan     Asthma with COPD ROS:  PERSISTENT BURGER.  Using bronchodilator MDI less than twice a week.   New concerns:PERSISTENT BURGER.   Exam: appears well, vitals normal, no respiratory distress, acyanotic, normal RR.   Assessment: Asthma with COPD .Stable.   Plan:  QVAR, VENTOLIN,SINGULAIR.  Current treatment plan is effective, no change in therapy.            Other    SABINA on CPAP - Primary    Current Assessment & Plan     She was intolerant of CPAP. However she wants to resume CPAP. CPAP retitiration is required by her insurance. Sheridan to dface visit required by insurance prior to CPAP re titration.    CPAP re titration ordered.              Relevant Orders    CPAP titration (Must have diagnosis of SABINA from previous sleep study.)    Class 2 severe obesity due to excess calories with serious comorbidity and body mass index (BMI) of 35.0 to 35.9 in adult    Current Assessment & Plan     General weight loss/lifestyle modification strategies discussed (elicit support from others; identify saboteurs; non-food rewards, etc).  Diet interventions: low calorie (1000 kCal/d)  deficit diet.                 TIME SPENT WITH PATIENT: Time spent: 40 minutes in face to face  discussion concerning diagnosis, prognosis, review of lab and test results, benefits of treatment as well as management of disease, counseling of patient and coordination of care between various health  care providers . Greater than half the time spent was used for coordination of care and counseling of patient.       Follow-up if symptoms worsen or fail to improve, for Asthma with COPD, SABINA, Morbid Obesity.

## 2019-02-19 ENCOUNTER — TELEPHONE (OUTPATIENT)
Dept: PULMONOLOGY | Facility: CLINIC | Age: 39
End: 2019-02-19

## 2019-02-19 NOTE — TELEPHONE ENCOUNTER
----- Message from Aminta Siu sent at 2/19/2019  8:55 AM CST -----  Contact: [place]-[facility]-[phone]  [place], with [facility], is returning call regarding patient.  Please call back at [phone].  Md Deidra

## 2019-02-20 ENCOUNTER — TELEPHONE (OUTPATIENT)
Dept: PULMONOLOGY | Facility: CLINIC | Age: 39
End: 2019-02-20

## 2019-03-05 ENCOUNTER — TELEPHONE (OUTPATIENT)
Dept: PULMONOLOGY | Facility: CLINIC | Age: 39
End: 2019-03-05

## 2019-03-05 NOTE — TELEPHONE ENCOUNTER
----- Message from Key Cristino sent at 3/5/2019 10:59 AM CST -----  Contact: pt  States she went to the ER yesterday and her sinuses were infected. States she would like to get a steroid and an antibiotic called in. Pt uses     [facility] [place] - [address]  [address]  [place] [address]  Phone: [phone] Fax: [phone]    Please call pt at [phone] or [phone].  Thank you

## 2019-04-18 ENCOUNTER — PATIENT OUTREACH (OUTPATIENT)
Dept: PULMONOLOGY | Facility: CLINIC | Age: 39
End: 2019-04-18

## 2019-04-18 NOTE — TELEPHONE ENCOUNTER
Chronic Disease Management  Called patient to complete Pulmonary Disease Management Questionnaire.  Patient states she is doing well but she does not use QVAR because it makes her mouth sore even if she brushes her teeth and rinse her mouth after taking.   Time  spent with patient:  Minutes

## 2019-05-21 ENCOUNTER — PATIENT OUTREACH (OUTPATIENT)
Dept: PULMONOLOGY | Facility: CLINIC | Age: 39
End: 2019-05-21

## 2019-06-26 ENCOUNTER — PATIENT OUTREACH (OUTPATIENT)
Dept: PULMONOLOGY | Facility: CLINIC | Age: 39
End: 2019-06-26

## 2019-06-26 NOTE — TELEPHONE ENCOUNTER
Chronic Disease Management  Called patient to complete Pulmonary Disease Management Questionnaire.  Patient stated she has lost approximately 20 pounds.  States she is feeling much better.  Patient is compliant with respiratory regimen.     Time  spent with patient: 4 Minutes

## 2019-07-29 ENCOUNTER — PATIENT OUTREACH (OUTPATIENT)
Dept: PULMONOLOGY | Facility: CLINIC | Age: 39
End: 2019-07-29

## 2019-07-29 NOTE — TELEPHONE ENCOUNTER
Chronic Disease Management  Called patient to complete Pulmonary Disease Management Questionnaire.   Asthma with COPD  SABINA on CPAP  Patient states she has thrush in her mouth and was prescribed diflucan. Patient states she is also taking antibiotics. Reminded patient rinse mouth thoroughly after using QVAR. Patient verbalized understanding.  Time  spent with patient: 5 Minutes

## 2019-08-28 NOTE — PROGRESS NOTES
Subjective:      Established patient    Patient ID: Floyd Luis is a 38 y.o. female.  Patient Active Problem List   Diagnosis    Dyspnea and respiratory abnormalities    Asthma with COPD    SABINA on CPAP    Class 2 severe obesity due to excess calories with serious comorbidity and body mass index (BMI) of 35.0 to 35.9 in adult       Problem list has been reviewed.    Chief Complaint: Asthma; COPD; and Sleep Apnea    Group Spirometric Classification Exacerbations / year mMRC CAT   Group B: Low risk; more symptoms  GOLD 1- 2  < = 1 >= 2 >=10   Group C: High risk; less symptoms  GOLD 3- 4  > = 2 0 - 1 <10   Group D: High risk; more sypmtoms  GOLD 3-4 > = 2 >= 2 >= 10     HPI      CT chest  reviewed with patient who expressed and voiced understanding.   All questions were answered to the patients satisfaction.      Patients reports NYHA III dyspnea    The patient does not have currently have symptoms / an exacerbation.       No recent change in breathing.     Her current respiratory therapy regimen is QVAR, VENTOLIN,SINGULAIR  which provides  relief. She is adherent with her regimen.        COPD QUESTIONNAIRE  How often do you cough?: A little of the time  How often do you have phlegm (mucus) in your chest?: A little of the time  How often does your chest feel tight?: Never  When you walk up a hill or one flight of stairs, how often are you breathless?: A little of the time  How often are you limited doing any activities at home?: Never  How often are you confident leaving the house despite your lung condition?: All of the time  How often do you sleep soundly?: Almost never  How often do you have energy?: Some of the time  Total score: 12      She is on CPAP of 8 CMWP. She has not been using her CPAP. She does tolerate the mask.    [place] Sleepiness Scale   EPWORTH SLEEPINESS SCALE 8/16/2018 5/29/2018   Sitting and reading 3 0   Watching TV 2 1   Sitting, inactive in a public place (e.g. a theatre or a  meeting) 3 1   As a passenger in a car for an hour without a break 0 1   Lying down to rest in the afternoon when circumstances permit 3 2   Sitting and talking to someone 0 0   Sitting quietly after a lunch without alcohol 2 2   In a car, while stopped for a few minutes in traffic 0 0   Total score 13 7        A full  review of systems, past , family  and social histories was performed except as mentioned in the note above, these are non contributory to the main issues discussed today.       Previous Report Reviewed: lab reports, office notes and radiology reports     The following portions of the patient's history were reviewed and updated as appropriate: She  has a past medical history of Asthma, Hypertension, and Thyroid disease.  She  has a past surgical history that includes Hysterectomy (2010); Hernia repair (2006); Bowel resection (Bilateral, 2002); and Gallbladder surgery (2015).  Her family history includes Hypertension in her brother and father.  She  reports that  has never smoked. she has never used smokeless tobacco. She reports that she does not drink alcohol or use drugs.  She has a current medication list which includes the following prescription(s): albuterol, amitiza, beclomethasone, fluticasone, furosemide, loratadine, metoprolol tartrate, montelukast, and ventolin hfa.  She is allergic to codeine; penicillins; and sulfamethoxazole-trimethoprim..    Review of Systems   Constitutional: Negative for fever, fatigue and night sweats.   HENT: Positive for sinus pressure and sore throat. Negative for congestion and ear pain.    Eyes: Negative for itching.   Respiratory: Positive for cough, wheezing and dyspnea on extertion. Negative for chest tightness.    Cardiovascular: Negative for chest pain and leg swelling.   Genitourinary: Negative for difficulty urinating.   Endocrine: Negative for cold intolerance and heat intolerance.    Musculoskeletal: Negative for arthralgias and back pain.   Skin:  "Negative for rash.   Gastrointestinal: Positive for nausea. Negative for vomiting and acid reflux.   Neurological: Positive for headaches. Negative for dizziness.   Hematological: Excessive bruising.   Psychiatric/Behavioral: The patient is nervous/anxious.    All other systems reviewed and are negative.       Objective:     /78   Pulse 77   Resp 18   Ht 5' 5" (1.651 m)   Wt 97.3 kg (214 lb 8.1 oz)   SpO2 99%   BMI 35.70 kg/m²   Body mass index is 35.7 kg/m².     Physical Exam   Constitutional: She is oriented to person, place, and time. She appears well-developed and well-nourished.   HENT:   Head: Normocephalic and atraumatic.   Eyes: EOM are normal. Pupils are equal, round, and reactive to light.   Neck: Normal range of motion. Neck supple.   Cardiovascular: Normal rate and regular rhythm.   Pulmonary/Chest: Effort normal and breath sounds normal.   Abdominal: Soft. Bowel sounds are normal.   Musculoskeletal: Normal range of motion.   Neurological: She is alert and oriented to person, place, and time.   Skin: Skin is warm and dry. Capillary refill takes less than 2 seconds.   Psychiatric: She has a normal mood and affect. Her behavior is normal.   Nursing note and vitals reviewed.      Personal Diagnostic Review        CT of chest performed on 05/29/18 with contrast:      A 4 mm nodule is noted on image 86 sequence 4 in the right upper lobe with a 2nd on image number 95.  A 3 mm nodule is noted within the right upper lobe on image number 118 with a 3 mm nodule on image 124 and a 3 mm nodule on image 133 that appears within the right middle lobe as well as a 3 mm nodule on image 137. A 2 mm nodule is noted on image 92 sequence 4 in the left upper lobe periphery.  A 3 mm nodule is noted on image 103 sequence 4 in the left upper lobe, and of 2 mm on image 111 along the major fissure      LABS:     Cytoplasmic Neutrophilic Ab <1:20 Titer <1:20      CHITRA Screen Negative <1:160 Negative <1:160      CRP 0.0 - " 8.2 mg/L 9.5       Sed Rate 0 - 20 mm/Hr 19           ABG:   Results for orders placed or performed in visit on 18   ISTAT PROCEDURE   Result Value Ref Range     POC PH 7.404 7.35 - 7.45     POC PCO2 39.9 35 - 45 mmHg     POC PO2 82 80 - 100 mmHg     POC HCO3 25.0 24 - 28 mmol/L     POC BE 0 -2 to 2 mmol/L     POC SATURATED O2 96 95 - 100 %     Sample ARTERIAL       Site LR       Allens Test Pass       DelSys Room Air           Normal ABG.      Pulmonary function tests: FEV1: 1.48 (54.9 % predicted), FVC:  2.28 (69.8 % predicted), FEV1/FVC:  65, T.45 (87.3 % predicted), RV/TLVC: 45 (141 % predicted), DLCO: 16 (57 % predicted)  : Test is of acceptable quality. Baseline spirometry reveals moderately severe expiratory airflow obstruction. Post bronchodilatory spirometry was not performed because patient  used her prescribed bronchodilator on the morning of the test. Static lung volumes reveal mild air trapping but no hyperinflation. Unadjusted diffusion capacity is mildly reduced, however diffusion capacity adjusted for alveolar volume is normal. This is because alveolar volume is underestimated indicating that there are large areas of underventliated lung.      Six Minute Walk Test:Six minute walk distance is 487.68 / 568.69 meters  (86.75 % predicted) with very, very heavy dyspnea. Peak VO2 during walking was 18.61  Ml/min/kg [ 5.32 METS]. During exercise, there was no significant desaturation while breathing room air. Both blood pressure and heart rate increased significantly with walking. The patient did not report non-pulmonary symptoms during exercise. Severe dyspnea  is likely due to subjective symptoms. The patient did complete the study, walking 360 seconds of the 360 second test. The patient may benefit from a graduated exercise program . No previous study performed. Based upon age and body mass index, exercise capacity is normal.       MARCIA Index for COPD Survival Prediction   Select Criteria:    FEV1 % Predicted After Bronchodialator     [] >= 65% (0 points)    [x] 50-64% (1 point)    [] 36-49% (2 points)    [] <= 35% (3 points)   6 Minute Walk Distance     [x] >= 350 Meters (0 points)    [] 250-349 Meters (1 point)    [] 150-249 Meters (2 points)    [] <= 149 Meters (3 points)   MMRC Dyspnea Scale (4 is worst)     [x] MMRC 0: Dyspneic on strenuous excercise (0 points)    [] MMRC 1: Dyspneic on walking a slight hill (0 points)    [] MMRC 2: Dyspneic on walking level ground; must stop occasionally due to breathlessness (1 point)    [] MMRC 3: Must stop for breathlessness after walking 100 yards or after a few minutes (2 points)    [] MMRC 4: Cannot leave house; breathless on dressing/undressing (3 points)   Body Mass Index     [x] > 21 (0 points)    [] <= 21 (1 point)   Results: Total Criteria Point Count:     Approximate 4 Year Survival Interpretation   0-2 Points:  [x] 80%   3-4 Points:  [] 67%   5-6 Points:  [] 57%   7-10 Points:[] 18%         References  1. Vivian BR, Stacey CG, Tim JM, et. al. The body-mass index, airflow obstruction, dyspnea and exercise capacity index in chronic obstructive pulmonary disease. N Engl J Med. 2004 Mar 4;350(10):1005-12.  2. Sunil DA, Regulo CK. Evaluation of clinical methods for rating dyspnea. Chest. 1988 Mar;93(3):580-6      Assessment / Plan:       Discussed diagnosis, its evaluation, treatment and usual course. All questions answered.    Problem List Items Addressed This Visit        Pulmonary    Asthma with COPD - Primary    Current Assessment & Plan     COPD ROS:  PERSISTENT BURGER.  Using bronchodilator MDI less than twice a week.   New concerns:PERSISTENT BURGER.   Exam: appears well, vitals normal, no respiratory distress, acyanotic, normal RR.   Assessment:  COPD control uncertain.   Plan: QVAR, VENTOLIN, SINGULAIR. Current treatment plan is effective, no change in therapy. AMBULATORY REFERRAL TO PULMONARY DISEASE MANAGEMENT.          Relevant Orders    CPAP/BIPAP  SUPPLIES       Other    SABINA on CPAP    Current Assessment & Plan     Continue CPAP of 8. (DME - [facility])     Discussed therapeutic goals for positive airway pressure therapy(CPAP or BiPAP): Ideal is usage 100% of nights for 6 - 8 hours per night. Minimum usage is 70% of night for at least 4 hours per night used. Pateint expressed understanding. All Questions answered.    CPAP supplies renewed.         Relevant Orders    Ambulatory Referral to Pulmonary Disease Management    Class 2 severe obesity due to excess calories with serious comorbidity and body mass index (BMI) of 35.0 to 35.9 in adult    Current Assessment & Plan     General weight loss/lifestyle modification strategies discussed (elicit support from others; identify saboteurs; non-food rewards, etc).  Diet interventions: low calorie (1000 kCal/d) deficit diet.                 TIME SPENT WITH PATIENT: Time spent: 45 minutes in face to face  discussion concerning diagnosis, prognosis, review of lab and test results, benefits of treatment as well as management of disease, counseling of patient and coordination of care between various health  care providers . Greater than half the time spent was used for coordination of care and counseling of patient.       Follow-up in about 6 months (around 2/16/2019) for Asthma with COPD, SABINA, Obesity.   no

## 2019-09-04 ENCOUNTER — PATIENT OUTREACH (OUTPATIENT)
Dept: PULMONOLOGY | Facility: CLINIC | Age: 39
End: 2019-09-04

## 2019-09-04 NOTE — TELEPHONE ENCOUNTER
Chronic Disease Management  Called patient to complete Pulmonary Disease Management Questionnaire.    Time  spent with patient: 2 Minutes

## 2019-09-10 ENCOUNTER — OFFICE VISIT (OUTPATIENT)
Dept: PULMONOLOGY | Facility: CLINIC | Age: 39
End: 2019-09-10
Payer: MEDICAID

## 2019-09-10 VITALS
HEIGHT: 65 IN | BODY MASS INDEX: 33.09 KG/M2 | WEIGHT: 198.63 LBS | DIASTOLIC BLOOD PRESSURE: 70 MMHG | SYSTOLIC BLOOD PRESSURE: 130 MMHG | OXYGEN SATURATION: 100 % | HEART RATE: 77 BPM | RESPIRATION RATE: 16 BRPM

## 2019-09-10 DIAGNOSIS — E66.01 CLASS 2 SEVERE OBESITY DUE TO EXCESS CALORIES WITH SERIOUS COMORBIDITY AND BODY MASS INDEX (BMI) OF 35.0 TO 35.9 IN ADULT: Chronic | ICD-10-CM

## 2019-09-10 DIAGNOSIS — J44.89 ASTHMA WITH COPD: Primary | Chronic | ICD-10-CM

## 2019-09-10 DIAGNOSIS — G47.33 OSA ON CPAP: Chronic | ICD-10-CM

## 2019-09-10 PROCEDURE — 99999 PR PBB SHADOW E&M-EST. PATIENT-LVL III: CPT | Mod: PBBFAC,,, | Performed by: INTERNAL MEDICINE

## 2019-09-10 PROCEDURE — 99999 PR PBB SHADOW E&M-EST. PATIENT-LVL III: ICD-10-PCS | Mod: PBBFAC,,, | Performed by: INTERNAL MEDICINE

## 2019-09-10 PROCEDURE — 99215 PR OFFICE/OUTPT VISIT, EST, LEVL V, 40-54 MIN: ICD-10-PCS | Mod: S$PBB,,, | Performed by: INTERNAL MEDICINE

## 2019-09-10 PROCEDURE — 99213 OFFICE O/P EST LOW 20 MIN: CPT | Mod: PBBFAC | Performed by: INTERNAL MEDICINE

## 2019-09-10 PROCEDURE — 99215 OFFICE O/P EST HI 40 MIN: CPT | Mod: S$PBB,,, | Performed by: INTERNAL MEDICINE

## 2019-09-10 RX ORDER — NYSTATIN 100000 [USP'U]/G
POWDER TOPICAL
Refills: 0 | COMMUNITY
Start: 2019-07-15

## 2019-09-10 RX ORDER — BUSPIRONE HYDROCHLORIDE 7.5 MG/1
7.5 TABLET ORAL 2 TIMES DAILY
Refills: 2 | COMMUNITY
Start: 2019-08-20

## 2019-09-10 RX ORDER — POTASSIUM CHLORIDE 750 MG/1
TABLET, EXTENDED RELEASE ORAL
COMMUNITY

## 2019-09-10 RX ORDER — ZOLPIDEM TARTRATE 10 MG/1
10 TABLET ORAL NIGHTLY
Refills: 2 | COMMUNITY
Start: 2019-08-20

## 2019-09-10 RX ORDER — PAROXETINE 10 MG/1
10 TABLET, FILM COATED ORAL NIGHTLY
Refills: 11 | COMMUNITY
Start: 2019-08-19

## 2019-09-10 RX ORDER — AMLODIPINE BESYLATE 5 MG/1
5 TABLET ORAL DAILY
Refills: 2 | COMMUNITY
Start: 2019-08-20

## 2019-09-10 RX ORDER — FLUTICASONE PROPIONATE AND SALMETEROL 250; 50 UG/1; UG/1
1 POWDER RESPIRATORY (INHALATION)
COMMUNITY

## 2019-09-10 RX ORDER — CLOTRIMAZOLE AND BETAMETHASONE DIPROPIONATE 10; .64 MG/G; MG/G
CREAM TOPICAL
Refills: 2 | COMMUNITY
Start: 2019-08-20

## 2019-09-10 RX ORDER — HYDROXYZINE PAMOATE 50 MG/1
CAPSULE ORAL
COMMUNITY
Start: 2018-10-25

## 2019-09-10 RX ORDER — NYSTATIN 100000 [USP'U]/ML
5 SUSPENSION ORAL
COMMUNITY

## 2019-09-10 RX ORDER — BENAZEPRIL HYDROCHLORIDE AND HYDROCHLOROTHIAZIDE 20; 12.5 MG/1; MG/1
1 TABLET ORAL DAILY
Refills: 0 | COMMUNITY
Start: 2019-07-22

## 2019-09-10 RX ORDER — CLINDAMYCIN PHOSPHATE 10 UG/ML
LOTION TOPICAL
COMMUNITY

## 2019-09-10 RX ORDER — VENLAFAXINE HYDROCHLORIDE 150 MG/1
CAPSULE, EXTENDED RELEASE ORAL
COMMUNITY
Start: 2018-10-25

## 2019-09-10 RX ORDER — NYSTATIN 100000 U/G
CREAM TOPICAL
Refills: 0 | COMMUNITY
Start: 2019-07-22

## 2019-09-10 RX ORDER — FLUCONAZOLE 150 MG/1
150 TABLET ORAL DAILY
Refills: 2 | COMMUNITY
Start: 2019-08-28 | End: 2019-12-24

## 2019-09-10 RX ORDER — TRIAMCINOLONE ACETONIDE 1 MG/G
CREAM TOPICAL
Refills: 0 | COMMUNITY
Start: 2019-07-22

## 2019-09-10 RX ORDER — ERGOCALCIFEROL 1.25 MG/1
50000 CAPSULE ORAL
Refills: 0 | COMMUNITY
Start: 2019-08-23

## 2019-09-10 NOTE — PROGRESS NOTES
Subjective:      Established patient    Patient ID: Floyd Luis is a 39 y.o. female.  Patient Active Problem List   Diagnosis    Asthma with COPD    SABINA on CPAP    Class 2 severe obesity due to excess calories with serious comorbidity and body mass index (BMI) of 35.0 to 35.9 in adult       Problem list has been reviewed.    Chief Complaint: Asthma (with COPD) and Sleep Apnea    Group Spirometric Classification Exacerbations / year mMRC CAT   Group B: Low risk; more symptoms  GOLD 1- 2  < = 1 >= 2 >=10     FEV1: 1.48 (54.9 % predicted       HPI    She reports that she just cant deal with the CPAP. She is currently not complaint with CPAP. She is requesting a nasal CPAP mask.       [place] Sleepiness Scale   EPWORTH SLEEPINESS SCALE 9/10/2019 1/30/2019 8/16/2018 5/29/2018   Sitting and reading 1 0 3 0   Watching TV 1 0 2 1   Sitting, inactive in a public place (e.g. a theatre or a meeting) 0 0 3 1   As a passenger in a car for an hour without a break 3 0 0 1   Lying down to rest in the afternoon when circumstances permit 0 3 3 2   Sitting and talking to someone 0 0 0 0   Sitting quietly after a lunch without alcohol 1 1 2 2   In a car, while stopped for a few minutes in traffic 0 0 0 0   Total score 6 4 13 7           Patients reports Stable NYHA II  Dyspnea with exertion.    The patient does not have currently have symptoms / an exacerbation.       No recent change in breathing.     Her current respiratory therapy regimen is QVAR, VENTOLIN,SINGULAIR  which provides  relief. She is adherent with her regimen.      Answers for HPI/ROS submitted by the patient on 9/10/2019   Asthma  In the past 4 weeks, how much of the time did your asthma keep you from getting as much done at work, school, or at home?: some of the time  During the past 4 weeks, how often have you had shortness of breath?: once a day  During the past 4 weeks, how often did your asthma symptoms (Wheezing, coughing, shortness of breath,  chest tightness or pain) wake you up at night or earlier that usual in the morning?: once or twice  During the past 4 weeks, how often have you used your rescue inhaler or nebulizer medication (such as albuterol)?: 1 or 2 times per day  How would you rate your asthma control during the past 4 weeks?: somewhat controlled   : 14       COPD QUESTIONNAIRE  How often do you cough?: Some of the time  How often do you have phlegm (mucus) in your chest?: Some of the time  How often does your chest feel tight?: Never  When you walk up a hill or one flight of stairs, how often are you breathless?: All of the time  How often are you limited doing any activities at home?: Some of the time  How often are you confident leaving the house despite your lung condition?: Most of the time  How often do you sleep soundly?: Never  How often do you have energy?: Some of the time  Total score: 22      A full  review of systems, past , family  and social histories was performed except as mentioned in the note above, these are non contributory to the main issues discussed today.       Previous Report Reviewed: lab reports, office notes and radiology reports     The following portions of the patient's history were reviewed and updated as appropriate: She  has a past medical history of Asthma, Hypertension, and Thyroid disease.  She  has a past surgical history that includes Hysterectomy (2010); Hernia repair (2006); Bowel resection (Bilateral, 2002); and Gallbladder surgery (2015).  Her family history includes Hypertension in her brother and father.  She  reports that she has never smoked. She has never used smokeless tobacco. She reports that she does not drink alcohol or use drugs.  She has a current medication list which includes the following prescription(s): albuterol, amitiza, amlodipine, beclomethasone, benazepril-hydrochlorthiazide, buspirone, clindamycin, clotrimazole-betamethasone 1-0.05%, fluconazole, fluticasone propionate,  "fluticasone-salmeterol 250-50 mcg/dose, furosemide, hydroxyzine pamoate, montelukast, nyamyc, nystatin, nystatin, paroxetine, potassium chloride, triamcinolone acetonide 0.1%, venlafaxine, ventolin hfa, vitamin d2, zolpidem, loratadine, and losartan-hydrochlorothiazide 50-12.5 mg.  She is allergic to codeine; penicillins; sulfa (sulfonamide antibiotics); and sulfamethoxazole-trimethoprim..    Review of Systems   Constitutional: Negative for fever, fatigue and night sweats.   HENT: Positive for sinus pressure and sore throat. Negative for congestion and ear pain.    Eyes: Negative for itching.   Respiratory: Positive for cough, wheezing and dyspnea on extertion. Negative for chest tightness.    Cardiovascular: Negative for chest pain and leg swelling.   Genitourinary: Negative for difficulty urinating.   Endocrine: Negative for cold intolerance and heat intolerance.    Musculoskeletal: Negative for arthralgias and back pain.   Skin: Negative for rash.   Gastrointestinal: Positive for nausea. Negative for vomiting and acid reflux.   Neurological: Positive for headaches. Negative for dizziness.   Hematological: Excessive bruising.   Psychiatric/Behavioral: The patient is nervous/anxious.    All other systems reviewed and are negative.       Objective:     /70   Pulse 77   Resp 16   Ht 5' 5" (1.651 m)   Wt 90.1 kg (198 lb 10.2 oz)   SpO2 100%   BMI 33.05 kg/m²   Body mass index is 33.05 kg/m².     Physical Exam   Constitutional: She is oriented to person, place, and time. She appears well-developed and well-nourished.   HENT:   Head: Normocephalic and atraumatic.   Eyes: Pupils are equal, round, and reactive to light. EOM are normal.   Neck: Normal range of motion. Neck supple.   Cardiovascular: Normal rate and regular rhythm.   Pulmonary/Chest: Effort normal and breath sounds normal.   Abdominal: Soft. Bowel sounds are normal.   Musculoskeletal: Normal range of motion.   Neurological: She is alert and " oriented to person, place, and time.   Skin: Skin is warm and dry. Capillary refill takes less than 2 seconds.   Psychiatric: She has a normal mood and affect. Her behavior is normal.   Nursing note and vitals reviewed.      Personal Diagnostic Review  None pertinent.    Assessment / Plan:       Discussed diagnosis, its evaluation, treatment and usual course. All questions answered.    Problem List Items Addressed This Visit        Pulmonary    Asthma with COPD - Primary    Current Assessment & Plan     Asthma with COPD ROS:  PERSISTENT BURGER.  Using bronchodilator MDI less than twice a week.   New concerns:STABLE BURGER.   Exam: appears well, vitals normal, no respiratory distress, acyanotic, normal RR.   Assessment: Asthma with COPD .Stable.   Plan:  QVAR, VENTOLIN,SINGULAIR.  Current treatment plan is effective, no change in therapy. PFT and CXR in 1 year.         Relevant Orders    Complete PFT with bronchodilator    X-Ray Chest PA And Lateral       Other    SABINA on CPAP    Current Assessment & Plan     Non complaince with CPAP of 8. (DME - [facility])     Complications of untreated sleep apnea discussed with pateint in detail including but not limited to  high blood pressure, heart attack, stroke, obesity,  diabetes and worsening heart failure. Patient voiced understanding.    Complaince encouraged.     CPAP supplies.          Relevant Orders    CPAP/BIPAP SUPPLIES    Class 2 severe obesity due to excess calories with serious comorbidity and body mass index (BMI) of 35.0 to 35.9 in adult    Current Assessment & Plan     General weight loss/lifestyle modification strategies discussed (elicit support from others; identify saboteurs; non-food rewards, etc).  Diet interventions: low calorie (1000 kCal/d) deficit diet.                      TIME SPENT WITH PATIENT: Time spent: 40 minutes in face to face  discussion concerning diagnosis, prognosis, review of lab and test results, benefits of treatment as well as management of  disease, counseling of patient and coordination of care between various health  care providers . Greater than half the time spent was used for coordination of care and counseling of patient.       Follow up in about 1 year (around 9/10/2020) for Asthma with COPD, SABINA, Obesity.

## 2019-09-10 NOTE — ASSESSMENT & PLAN NOTE
General weight loss/lifestyle modification strategies discussed (elicit support from others; identify saboteurs; non-food rewards, etc).  Diet interventions: low calorie (1000 kCal/d) deficit diet.

## 2019-09-10 NOTE — PATIENT INSTRUCTIONS
Lung Anatomy  Your lungs take air in to give your body oxygen, which the body needs to work. Your lungs, like all the tissues in your body, are made up of billions of tiny specialized cells. Old lung cells die and are replaced by new, identical lung cells. This natural process helps ensure healthy lungs.    Date Last Reviewed: 11/1/2016  © 1112-1457 [employer]. [address] [place] [address], [place] [address]. All rights reserved. This information is not intended as a substitute for professional medical care. Always follow your healthcare professional's instructions.

## 2019-09-10 NOTE — ASSESSMENT & PLAN NOTE
Asthma with COPD ROS:  PERSISTENT BURGER.  Using bronchodilator MDI less than twice a week.   New concerns:STABLE BURGER.   Exam: appears well, vitals normal, no respiratory distress, acyanotic, normal RR.   Assessment: Asthma with COPD .Stable.   Plan:  QVAR, VENTOLIN,SINGULAIR.  Current treatment plan is effective, no change in therapy. PFT and CXR in 1 year.

## 2019-09-10 NOTE — ASSESSMENT & PLAN NOTE
Non complaince with CPAP of 8. (DME - [facility])     Complications of untreated sleep apnea discussed with pateint in detail including but not limited to  high blood pressure, heart attack, stroke, obesity,  diabetes and worsening heart failure. Patient voiced understanding.    Complaince encouraged.     CPAP supplies.

## 2019-09-11 RX ORDER — ALBUTEROL SULFATE 90 UG/1
1-2 AEROSOL, METERED RESPIRATORY (INHALATION) EVERY 4 HOURS PRN
Qty: 18 G | Refills: 11 | Status: SHIPPED | OUTPATIENT
Start: 2019-09-11

## 2019-09-11 NOTE — TELEPHONE ENCOUNTER
----- Message from Franky Randall sent at 9/11/2019  4:02 PM CDT -----  Contact: pt   Pt would like cb in reference to inhaler.  states urgent         [phone]

## 2019-12-16 ENCOUNTER — TELEPHONE (OUTPATIENT)
Dept: PULMONOLOGY | Facility: CLINIC | Age: 39
End: 2019-12-16

## 2019-12-17 ENCOUNTER — TELEPHONE (OUTPATIENT)
Dept: PULMONOLOGY | Facility: CLINIC | Age: 39
End: 2019-12-17

## 2019-12-17 NOTE — TELEPHONE ENCOUNTER
----- Message from Zuleima Serrano sent at 12/17/2019  4:57 PM CST -----  Contact: pt [phone]  Pt returing call    Pt can be reached at th eabove number

## 2019-12-17 NOTE — TELEPHONE ENCOUNTER
----- Message from Franky Randall sent at 12/16/2019  4:16 PM CST -----  Contact: pt   Pt would like cb to see about getting worked in for an appt next week.       [phone]

## 2019-12-17 NOTE — TELEPHONE ENCOUNTER
----- Message from Yamel Haro sent at 12/17/2019  1:18 PM CST -----  Contact: pt  Type:  Sooner Apoointment Request    Caller is requesting a sooner appointment.  Caller declined first available appointment listed below.  Caller will not accept being placed on the waitlist and is requesting a message be sent to doctor.  Name of Caller: the pt   When is the first available appointment? 02/27/2020  Symptoms: cough, hard to catch breath  Would the patient rather a call back or a response via [facility]? Call back  Best Call Back Number: [phone]  Additional Information: the pt wants to be seen the pt week of 12/23/2019

## 2019-12-19 DIAGNOSIS — R05.9 COUGH: Primary | ICD-10-CM

## 2019-12-24 ENCOUNTER — HOSPITAL ENCOUNTER (OUTPATIENT)
Dept: RADIOLOGY | Facility: HOSPITAL | Age: 39
Discharge: HOME | End: 2019-12-24
Attending: INTERNAL MEDICINE
Payer: MEDICAID

## 2019-12-24 ENCOUNTER — OFFICE VISIT (OUTPATIENT)
Dept: PULMONOLOGY | Facility: CLINIC | Age: 39
End: 2019-12-24
Payer: MEDICAID

## 2019-12-24 VITALS
BODY MASS INDEX: 34.54 KG/M2 | HEART RATE: 100 BPM | OXYGEN SATURATION: 97 % | WEIGHT: 207.31 LBS | SYSTOLIC BLOOD PRESSURE: 118 MMHG | RESPIRATION RATE: 18 BRPM | HEIGHT: 65 IN | DIASTOLIC BLOOD PRESSURE: 70 MMHG

## 2019-12-24 DIAGNOSIS — G47.33 OSA ON CPAP: Chronic | ICD-10-CM

## 2019-12-24 DIAGNOSIS — J45.901 ASTHMA WITH COPD WITH EXACERBATION: Primary | ICD-10-CM

## 2019-12-24 DIAGNOSIS — R05.9 COUGH: ICD-10-CM

## 2019-12-24 DIAGNOSIS — E66.01 CLASS 2 SEVERE OBESITY DUE TO EXCESS CALORIES WITH SERIOUS COMORBIDITY AND BODY MASS INDEX (BMI) OF 35.0 TO 35.9 IN ADULT: Chronic | ICD-10-CM

## 2019-12-24 DIAGNOSIS — J44.1 ASTHMA WITH COPD WITH EXACERBATION: Primary | ICD-10-CM

## 2019-12-24 PROCEDURE — 99999 PR PBB SHADOW E&M-EST. PATIENT-LVL V: CPT | Mod: PBBFAC,,, | Performed by: INTERNAL MEDICINE

## 2019-12-24 PROCEDURE — 99215 OFFICE O/P EST HI 40 MIN: CPT | Mod: PBBFAC,25 | Performed by: INTERNAL MEDICINE

## 2019-12-24 PROCEDURE — 99215 OFFICE O/P EST HI 40 MIN: CPT | Mod: S$PBB,,, | Performed by: INTERNAL MEDICINE

## 2019-12-24 PROCEDURE — 71046 X-RAY EXAM CHEST 2 VIEWS: CPT | Mod: 26,,, | Performed by: RADIOLOGY

## 2019-12-24 PROCEDURE — 71046 X-RAY EXAM CHEST 2 VIEWS: CPT | Mod: TC

## 2019-12-24 PROCEDURE — 99215 PR OFFICE/OUTPT VISIT, EST, LEVL V, 40-54 MIN: ICD-10-PCS | Mod: S$PBB,,, | Performed by: INTERNAL MEDICINE

## 2019-12-24 PROCEDURE — 99999 PR PBB SHADOW E&M-EST. PATIENT-LVL V: ICD-10-PCS | Mod: PBBFAC,,, | Performed by: INTERNAL MEDICINE

## 2019-12-24 PROCEDURE — 71046 XR CHEST PA AND LATERAL: ICD-10-PCS | Mod: 26,,, | Performed by: RADIOLOGY

## 2019-12-24 RX ORDER — LEVOFLOXACIN 750 MG/1
750 TABLET ORAL DAILY
Qty: 7 TABLET | Refills: 0 | Status: SHIPPED | OUTPATIENT
Start: 2019-12-24 | End: 2019-12-31

## 2019-12-24 RX ORDER — AZELASTINE 1 MG/ML
SPRAY, METERED NASAL
Refills: 3 | COMMUNITY
Start: 2019-12-13

## 2019-12-24 RX ORDER — BENZONATATE 100 MG/1
CAPSULE ORAL
COMMUNITY
End: 2019-12-24

## 2019-12-24 RX ORDER — BENZONATATE 100 MG/1
100 CAPSULE ORAL EVERY 4 HOURS PRN
Qty: 120 CAPSULE | Refills: 3 | Status: SHIPPED | OUTPATIENT
Start: 2019-12-24

## 2019-12-24 RX ORDER — TRIAMCINOLONE ACETONIDE 40 MG/ML
80 INJECTION, SUSPENSION INTRA-ARTICULAR; INTRAMUSCULAR ONCE
Status: COMPLETED | OUTPATIENT
Start: 2019-12-24 | End: 2019-12-24

## 2019-12-24 RX ORDER — POLYETHYLENE GLYCOL 3350 17 G/17G
POWDER, FOR SOLUTION ORAL
COMMUNITY

## 2019-12-24 RX ORDER — PREDNISONE 10 MG/1
TABLET ORAL
Qty: 60 TABLET | Refills: 0 | Status: SHIPPED | OUTPATIENT
Start: 2019-12-24

## 2019-12-24 RX ORDER — LEVOCETIRIZINE DIHYDROCHLORIDE 5 MG/1
5 TABLET, FILM COATED ORAL DAILY
Refills: 3 | COMMUNITY
Start: 2019-12-13

## 2019-12-24 RX ORDER — GABAPENTIN 100 MG/1
100 CAPSULE ORAL 3 TIMES DAILY
Refills: 0 | COMMUNITY
Start: 2019-11-20

## 2019-12-24 RX ADMIN — TRIAMCINOLONE ACETONIDE 80 MG: 400 INJECTION, SUSPENSION INTRA-ARTICULAR; INTRAMUSCULAR at 11:12

## 2019-12-24 NOTE — ASSESSMENT & PLAN NOTE
Non complaince with CPAP of 8. (DME - [facility])     Complications of untreated sleep apnea discussed with pateint in detail including but not limited to  high blood pressure, heart attack, stroke, obesity,  diabetes and worsening heart failure. Patient voiced understanding.    Complaince encouraged.

## 2019-12-24 NOTE — PATIENT INSTRUCTIONS
Levofloxacin oral solution  What is this medicine?  LEVOFLOXACIN(domingo krueger FLOX a sin) is a quinolone antibiotic. It is used to treat certain kinds of bacterial infections. It will not work for colds, flu, or other viral infections.  How should I use this medicine?  Take this medicine by mouth 1 hour before, or 2 hours after eating. Follow the directions on the prescription label. Use a specially marked spoon to measure the dose. Household spoons are not accurate. Take the medicine at the same times each day. Finish all of the medicine even if you think you are better.  A special MedGuide will be given to you by the pharmacist with each prescription and refill. Be sure to read this information carefully each time.  Talk to your pediatrician regarding the use of this medicine in children. While this drug may be prescribed for children as young as 6 months for selected conditions, precautions do apply.  What side effects may I notice from receiving this medicine?  Side effects that you should report to your doctor or health care professional as soon as possible:  · allergic reactions like skin rash or hives, swelling of the face, lips, or tongue  · anxious  · confusion  · depressed mood  · diarrhea  · fast, irregular heartbeat  · hallucination, loss of contact with reality  · joint, muscle, or tendon pain or swelling  · pain, tingling, numbness in the hands or feet  · suicidal thoughts or other mood changes  · sunburn  · unusually weak or tired  Side effects that usually do not require medical attention (report to your doctor or health care professional if they continue or are bothersome):  · dry mouth  · headache  · nausea  · trouble sleeping  What may interact with this medicine?  Do not take this medicine with any of the following medications:  · arsenic trioxide  · chloroquine  · droperidol  · medicines for irregular heart rhythm like amiodarone, disopyramide, dofetilide, flecainide, quinidine, procainamide,  sotalol  · some medicines for depression or mental problems like phenothiazines, pimozide, and ziprasidone  This medicine may also interact with the following medications:  · amoxapine  · antacids  · birth control pills  · cisapride  · dairy products  · didanosine (ddI) buffered tablets or powder  · haloperidol  · multivitamins  · NSAIDS, medicines for pain and inflammation, like ibuprofen or naproxen  · retinoid products like tretinoin or isotretinoin  · risperidone  · some other antibiotics like clarithromycin or erythromycin  · sucralfate  · theophylline  · warfarin  What if I miss a dose?  If you miss a dose, take it as soon as you can. If it is almost time for your next dose, take only that dose. Do not take double or extra doses.  Where should I keep my medicine?  Keep out of the reach of children.  Store at room temperature of 15 to 30 degrees C (59 to 86 degrees F). Throw away any unused medicine after the expiration date.  What should I tell my health care provider before I take this medicine?  They need to know if you have any of these conditions:  · bone problems  · cerebral disease  · history of low levels of potassium in the blood  · irregular heartbeat  · joint problems  · kidney disease  · myasthenia gravis  · seizures  · tendon problems  · tingling of the fingers or toes, or other nerve disorder  · an unusual or allergic reaction to levofloxacin, other quinolone antibiotics, foods, dyes, or preservatives  · pregnant or trying to get pregnant  · breast-feeding  What should I watch for while using this medicine?  Tell your doctor or health care professional if your symptoms do not begin to improve in a few days. Drink several glasses of water a day and cut down on drinks that contain caffeine. You must not get dehydrated.  You may get drowsy or dizzy. Do not drive, use machinery, or do anything that needs mental alertness until you know how this medicine affects you. Do not stand or sit up quickly,  especially if you are an older patient. This reduces the risk of dizzy or fainting spells.  This medicine can make you more sensitive to the sun. Keep out of the sun. If you cannot avoid being in the sun, wear protective clothing and use a sunscreen. Do not use sun lamps or tanning beds/booths. Contact your doctor if you get a sunburn.  If you are a diabetic monitor your blood glucose carefully. If you get an unusual reading stop taking this medicine and call your doctor right away.  Do not treat diarrhea with over-the-counter products. Contact your doctor if you have diarrhea that lasts more than 2 days or if the diarrhea is severe and watery.  Avoid antacids, calcium, iron, and zinc products for 2 hours before and 2 hours after taking a dose of this medicine.  NOTE:This sheet is a summary. It may not cover all possible information. If you have questions about this medicine, talk to your doctor, pharmacist, or health care provider. Copyright© 2017 [employer]

## 2019-12-24 NOTE — ASSESSMENT & PLAN NOTE
Asthma with COPD ROS:  notes increased wheezing and dyspnea.  New concerns: Progressive dyspnea and increasing wheezing.    Exam:  wheezing noted diffusely.   Assessment: Asthma with COPD loss of control due to intercurrent illness.   Plan:   1. KENALOG 80MG IM X 1  2. LEVAQUIN 750 MG PO DAILY X 7 DAYS  3. PREDNISONE TAPER PER ORDERS  4. CONTINUE QVAR,VENTOLIN,SINGULAIR  AS PRESCRIBED.    Re evaluate clinically in 1 month(s).

## 2020-08-24 DIAGNOSIS — J45.901 ASTHMA WITH COPD WITH EXACERBATION: Primary | ICD-10-CM

## 2020-08-24 DIAGNOSIS — J44.1 ASTHMA WITH COPD WITH EXACERBATION: Primary | ICD-10-CM

## 2020-08-28 ENCOUNTER — TELEPHONE (OUTPATIENT)
Dept: PULMONOLOGY | Facility: CLINIC | Age: 40
End: 2020-08-28

## 2020-09-08 ENCOUNTER — TELEPHONE (OUTPATIENT)
Dept: PULMONOLOGY | Facility: CLINIC | Age: 40
End: 2020-09-08

## 2020-09-08 NOTE — TELEPHONE ENCOUNTER
Dr. Patton's nurse notified of patient not completing Covid test.  Nurse will contact patient to reschedule Covid test, PFT, and doctor appointments.

## 2021-07-01 ENCOUNTER — PATIENT MESSAGE (OUTPATIENT)
Dept: ADMINISTRATIVE | Facility: OTHER | Age: 41
End: 2021-07-01

## 2021-07-06 DIAGNOSIS — J44.9 CHRONIC OBSTRUCTIVE PULMONARY DISEASE, UNSPECIFIED COPD TYPE: Primary | ICD-10-CM

## 2021-10-04 ENCOUNTER — TELEPHONE (OUTPATIENT)
Dept: PULMONOLOGY | Facility: CLINIC | Age: 41
End: 2021-10-04

## 2021-10-06 ENCOUNTER — PATIENT MESSAGE (OUTPATIENT)
Dept: PULMONOLOGY | Facility: CLINIC | Age: 41
End: 2021-10-06

## 2021-10-11 ENCOUNTER — TELEPHONE (OUTPATIENT)
Dept: PULMONOLOGY | Facility: CLINIC | Age: 41
End: 2021-10-11

## 2022-07-06 ENCOUNTER — HOSPITAL ENCOUNTER (OUTPATIENT)
Facility: HOSPITAL | Age: 42
Discharge: HOME | End: 2022-07-06
Attending: INTERNAL MEDICINE | Admitting: INTERNAL MEDICINE
Payer: MEDICAID

## 2022-07-06 VITALS
OXYGEN SATURATION: 97 % | DIASTOLIC BLOOD PRESSURE: 98 MMHG | TEMPERATURE: 98 F | SYSTOLIC BLOOD PRESSURE: 169 MMHG | RESPIRATION RATE: 18 BRPM | HEIGHT: 65 IN | WEIGHT: 228 LBS | HEART RATE: 88 BPM | BODY MASS INDEX: 37.99 KG/M2

## 2022-07-06 PROCEDURE — 91010 ESOPHAGUS MOTILITY STUDY: CPT | Mod: TC | Performed by: INTERNAL MEDICINE

## 2022-07-06 PROCEDURE — 91037 ESOPH IMPED FUNCTION TEST: CPT | Mod: TC | Performed by: INTERNAL MEDICINE

## 2022-07-06 PROCEDURE — 25000003 PHARM REV CODE 250: Performed by: INTERNAL MEDICINE

## 2022-07-06 RX ORDER — LIDOCAINE HYDROCHLORIDE 20 MG/ML
2 SOLUTION OROPHARYNGEAL ONCE
Status: COMPLETED | OUTPATIENT
Start: 2022-07-06 | End: 2022-07-06

## 2022-07-06 RX ADMIN — LIDOCAINE HYDROCHLORIDE 2 ML: 20 SOLUTION ORAL; TOPICAL at 11:07

## 2022-07-12 PROCEDURE — 91010 ESOPHAGUS MOTILITY STUDY: CPT | Mod: 26,,, | Performed by: INTERNAL MEDICINE

## 2022-07-12 PROCEDURE — 91010 PR ESOPHAGEAL MOTILITY STUDY, MA2METRY: ICD-10-PCS | Mod: 26,,, | Performed by: INTERNAL MEDICINE

## 2022-07-12 PROCEDURE — 91037 PR GERD TST W/ NASAL IMPEDENCE ELECTROD: ICD-10-PCS | Mod: 26,,, | Performed by: INTERNAL MEDICINE

## 2022-07-12 PROCEDURE — 91037 ESOPH IMPED FUNCTION TEST: CPT | Mod: 26,,, | Performed by: INTERNAL MEDICINE

## 2022-07-12 NOTE — [employer] PATIENT INSTRUCTIONS
Discharge Summary/Instructions after an Endoscopic Procedure  Patient Name: Floyd Luis  Patient MRN: [id number]  Patient YOB: 1980 Wednesday, July 6, 2022 Flora Hearn MD  Dear patient,  As a result of recent federal legislation (The Federal Cures Act), you may   receive lab or pathology results from your procedure in your [facility]   account before your physician is able to contact you. Your physician or   their representative will relay the results to you with their   recommendations at their soonest availability.  Thank you,  RESTRICTIONS:  During your procedure today, you received medications for sedation.  These   medications may affect your judgment, balance and coordination.  Therefore,   for 24 hours, you have the following restrictions:   - DO NOT drive a car, operate machinery, make legal/financial decisions,   sign important papers or drink alcohol.    ACTIVITY:  Today: no heavy lifting, straining or running due to procedural   sedation/anesthesia.  The following day: return to full activity including work.  DIET:  Eat and drink normally unless instructed otherwise.     TREATMENT FOR COMMON SIDE EFFECTS:  - Mild abdominal pain, nausea, belching, bloating or excessive gas:  rest,   eat lightly and use a heating pad.  - Sore Throat: treat with throat lozenges and/or gargle with warm salt   water.  - Because air was used during the procedure, expelling large amounts of air   from your rectum or belching is normal.  - If a bowel prep was taken, you may not have a bowel movement for 1-3 days.    This is normal.  SYMPTOMS TO WATCH FOR AND REPORT TO YOUR PHYSICIAN:  1. Abdominal pain or bloating, other than gas cramps.  2. Chest pain.  3. Back pain.  4. Signs of infection such as: chills or fever occurring within 24 hours   after the procedure.  5. Rectal bleeding, which would show as bright red, maroon, or black stools.   (A tablespoon of blood from the rectum is not serious, especially  if   hemorrhoids are present.)  6. Vomiting.  7. Weakness or dizziness.  GO DIRECTLY TO THE NEAREST EMERGENCY ROOM IF YOU HAVE ANY OF THE FOLLOWING:      Difficulty breathing              Chills and/or fever over 101 F   Persistent vomiting and/or vomiting blood   Severe abdominal pain   Severe chest pain   Black, tarry stools   Bleeding- more than one tablespoon   Any other symptom or condition that you feel may need urgent attention  Your doctor recommends these additional instructions:  If any biopsies were taken, your doctors clinic will contact you in 1 to 2   weeks with any results.  - Continue present medications.  For questions, problems or results please call your physician Flora Hearn MD at Work:  [phone]  If you have any questions about the above instructions, call the GI   department at [phone] or call the endoscopy unit at [phone]   from 7am until 3 pm.  [facility] [place], EMERGENCY ROOM PHONE NUMBER:   [phone]  IF A COMPLICATION OR EMERGENCY SITUATION ARISES AND YOU ARE UNABLE TO REACH   YOUR PHYSICIAN - GO DIRECTLY TO THE EMERGENCY ROOM.  I have read or have had read to me these discharge instructions for my   procedure and have received a written copy.  I understand these   instructions and will follow-up with my physician if I have any questions.     __________________________________       _____________________________________  Nurse Signature                                          Patient/Designated   Responsible Party Signature  MD Flora Louis MD  7/12/2022 9:22:20 AM  [employer]

## 2022-09-13 ENCOUNTER — TELEPHONE (OUTPATIENT)
Dept: PULMONOLOGY | Facility: CLINIC | Age: 42
End: 2022-09-13
Payer: MEDICAID

## 2022-09-13 NOTE — TELEPHONE ENCOUNTER
----- Message from Gianna Garcia sent at 9/13/2022  2:31 PM CDT -----  Contact: Floyd  Type:  Same Day Appointment Request    Caller is requesting a same day appointment.    Name of Caller:Floyd  When is the first available appointment?10/12/22  Symptoms:shortness of breath  Best Call Back Number:[phone]  Additional Information: Patient request to be seen sooner than next available. Please give patient a call back when possible.  Thank you,  GH

## 2023-04-11 ENCOUNTER — PATIENT MESSAGE (OUTPATIENT)
Dept: RESEARCH | Facility: HOSPITAL | Age: 43
End: 2023-04-11
Payer: MEDICAID

## 2024-04-03 ENCOUNTER — PATIENT MESSAGE (OUTPATIENT)
Dept: PULMONOLOGY | Facility: CLINIC | Age: 44
End: 2024-04-03
Payer: MEDICAID

## 2024-07-29 NOTE — TELEPHONE ENCOUNTER
Patient complains of sinus infection, headache and sinus pressure. Requesting antibiotic, explained to patient to call PCP or ENT for further eval    Jace, Molly